# Patient Record
Sex: MALE | Race: WHITE | Employment: UNEMPLOYED | URBAN - METROPOLITAN AREA
[De-identification: names, ages, dates, MRNs, and addresses within clinical notes are randomized per-mention and may not be internally consistent; named-entity substitution may affect disease eponyms.]

---

## 2017-02-14 ENCOUNTER — OFFICE VISIT (OUTPATIENT)
Dept: FAMILY MEDICINE CLINIC | Age: 67
End: 2017-02-14

## 2017-02-14 VITALS
HEART RATE: 89 BPM | WEIGHT: 161 LBS | DIASTOLIC BLOOD PRESSURE: 96 MMHG | RESPIRATION RATE: 16 BRPM | HEIGHT: 63 IN | BODY MASS INDEX: 28.53 KG/M2 | SYSTOLIC BLOOD PRESSURE: 153 MMHG | TEMPERATURE: 97.4 F | OXYGEN SATURATION: 95 %

## 2017-02-14 DIAGNOSIS — E11.00 TYPE 2 DIABETES MELLITUS WITH HYPEROSMOLARITY WITHOUT COMA, WITHOUT LONG-TERM CURRENT USE OF INSULIN (HCC): Primary | ICD-10-CM

## 2017-02-14 LAB — HBA1C MFR BLD HPLC: 6.2 %

## 2017-02-14 NOTE — PROGRESS NOTES
1. Have you been to the ER, urgent care clinic since your last visit? Hospitalized since your last visit? No    2. Have you seen or consulted any other health care providers outside of the [address] [place] [address] since your last visit? Include any pap smears or colon screening.  No

## 2017-02-14 NOTE — MR AVS SNAPSHOT
Visit Information Date & Time Provider Department Dept. Phone Encounter #  
 2/14/2017  9:00 AM Colby Vaughn [phone] [id number] Follow-up Instructions Return in about 3 months (around 5/14/2017) for rov, BENDER PLANT [facility] next visit. Follow-up and Disposition History Your Appointments 8/31/2017 11:00 AM  
ESTABLISHED PATIENT with Adrienne Cleaning MD  
[facility] Appt Note: R/S due to Dr Piedad Dougherty running late; annual f/u rv renal lasix scan  
 [address], [address]  
  
   
 [address], [address] [facility] [address] Upcoming Health Maintenance Date Due Pneumococcal 65+ Low/Medium Risk (2 of 2 - PPSV23) 10/10/2016 HEMOGLOBIN A1C Q6M 5/14/2017 MICROALBUMIN Q1 5/16/2017 MEDICARE YEARLY EXAM 8/17/2017 LIPID PANEL Q1 12/5/2017 GLAUCOMA SCREENING Q2Y 8/16/2018 DTaP/Tdap/Td series (2 - Td) 11/10/2021 COLONOSCOPY 8/16/2026 Allergies as of 2/14/2017  Review Complete On: 2/14/2017 By: Kell Grewal., DO Severity Noted Reaction Type Reactions Januvia [Sitagliptin]  04/13/2016    Diarrhea Morphine  04/13/2016    Other (comments) \"falls asleep\" Penicillins  04/13/2016    Unknown (comments) Current Immunizations  Reviewed on 5/1/2013 Name Date Influenza High Dose Vaccine PF 11/14/2016, 11/30/2015 Influenza Vaccine 10/6/2014, 11/1/2013 10:53 AM  
 Influenza Vaccine Split 10/3/2012 Influenza Vaccine Whole 9/10/2011 Pneumococcal Conjugate (PCV-13) 3/16/2015 Pneumococcal Vaccine (Unspecified Type) 10/10/2011 TDAP Vaccine 11/10/2011 Zoster Vaccine, Live 5/1/2013 Not reviewed this visit You Were Diagnosed With   
  
 Codes Comments  Type 2 diabetes mellitus with hyperosmolarity without coma, without long-term current use of insulin (HCC)    -  Primary ICD-10-CM: E11.00 
 ICD-9-CM: 250.20 Vitals BP Pulse Temp Resp Height(growth percentile) Weight(growth percentile) (!) 153/96 (BP 1 Location: Right arm, BP Patient Position: Sitting) 89 97.4 °F (36.3 °C) (Oral) 16 5' 3\" (1.6 m) 161 lb (73 kg) SpO2 BMI Smoking Status 95% 28.52 kg/m2 Former Smoker BMI and BSA Data Body Mass Index Body Surface Area 28.52 kg/m 2 1.8 m 2 Preferred Pharmacy Pharmacy Name Phone [id number] [address], [address] [facility] [phone] Your Updated Medication List  
  
   
This list is accurate as of: 2/14/17 10:11 AM.  Always use your most recent med list.  
  
  
  
  
 ASPIRIN PO Take 325 mg by mouth. Blood-Glucose Meter monitoring kit Any reliable brand, dx 250.00 , use twice a day  
  
 glucose blood VI test strips strip Commonly known as:  blood glucose test  
Use twice a day, any reliable brand to use with patients glucometer  Dx 250.00  
  
 insulin glargine 100 unit/mL (3 mL) pen Commonly known as:  LANTUS SOLOSTAR  
30 units daily  Indications: type 2 diabetes mellitus Insulin Needles (Disposable) 29 gauge x 1/2\" Ndle Commonly known as:  BD INSULIN PEN NEEDLE UF ORIG  
USE DAILY WITH LANTUS Lancets Misc Twice a day  
  
 tamsulosin 0.4 mg capsule Commonly known as:  FLOMAX TAKE ONE CAPSULE BY MOUTH EVERY DAY AFTER DINNER We Performed the Following AMB POC HEMOGLOBIN A1C [73913 CPT(R)] Comments:  
 Verbal order with read back per Dr. Hira Hanks request  
  
Follow-up Instructions Return in about 3 months (around 5/14/2017) for rov, BENDER PLANT [facility] next visit. Introducing [facility]! Dear Lenny Gain: Thank you for requesting a eshtery account. Our records indicate that you already have an active eshtery account. You can access your account anytime at [email or web address] Did you know that you can access your hospital and ER discharge instructions at any time in Aktino? You can also review all of your test results from your hospital stay or ER visit. Additional Information If you have questions, please visit the Frequently Asked Questions section of the Aktino website at [email or web address] Remember, Aktino is NOT to be used for urgent needs. For medical emergencies, dial 911. Now available from your iPhone and Android! Please provide this summary of care documentation to your next provider. Your primary care clinician is listed as [id number] [facility]. If you have any questions after today's visit, please call [phone].

## 2017-02-14 NOTE — PROGRESS NOTES
Jennifer Darden is a 78 y.o.  male and presents with    Chief Complaint   Patient presents with    Diabetes           Subjective:    Diabetes Mellitus:  He has diabetes mellitus, and  diabetes. Diabetic ROS - diabetic diet compliance: compliant all of the time. Lab review: labs are reviewed, up to date and normal.     Additional Concerns:          Patient Active Problem List    Diagnosis Date Noted    ACP (advance care planning) 08/16/2016    Benign prostatic hyperplasia with lower urinary tract symptoms 02/04/2016    Type II diabetes mellitus ([address] 04/22/2015    Cerebral infarction ([facility] 12/07/2012    Pure hypercholesterolemia 11/10/2011     Current Outpatient Prescriptions   Medication Sig Dispense Refill    tamsulosin (FLOMAX) 0.4 mg capsule TAKE ONE CAPSULE BY MOUTH EVERY DAY AFTER DINNER 90 Cap 2    Insulin Needles, Disposable, (BD INSULIN PEN NEEDLE UF ORIG) 29 gauge x 1/2\" ndle USE DAILY WITH LANTUS 100 Pen Needle 12    Blood-Glucose Meter monitoring kit Any reliable brand, dx 250.00 , use twice a day 1 Kit 0    glucose blood VI test strips (BLOOD GLUCOSE TEST) strip Use twice a day, any reliable brand to use with patients glucometer  Dx 250.00 200 Strip 11    insulin glargine (LANTUS SOLOSTAR) 100 unit/mL (3 mL) pen 30 units daily  Indications: type 2 diabetes mellitus 15 Each 12    Lancets misc Twice a day 200 Each 11    ASPIRIN PO Take 325 mg by mouth.        Allergies   Allergen Reactions    Januvia [Sitagliptin] Diarrhea    Morphine Other (comments)     \"falls asleep\"    Penicillins Unknown (comments)     Past Medical History   Diagnosis Date    Benign prostatic hyperplasia with lower urinary tract symptoms     Cerebral infarction due to thrombosis of precerebral artery (HCC)     Diabetes mellitus ([address] 11/10/2011    Dizzy spells     Hemangioma     Hepatocellular carcinoma ([facility] [address] 11/10/2011    History of MRI     Hydronephrosis 11/10/2011    Liver mass 11/10/2011    Multiple sclerosis ([facility] 11/10/2011     testing was negative for multiple sclerosis    Neuropathy 3/1/2012    Pure hypercholesterolemia 11/10/2011    Renal calculi 11/10/2011    UPJ (ureteropelvic junction) obstruction     Ureteral stent retained     Weak urinary stream      Past Surgical History   Procedure Laterality Date    Hx orchiectomy Left 1970    Hx urological  4/20/16     Cystoscopy, Left retrograde pyelogramLeft robotic-assisted laparoscopic dismembered pyeloplasty with antegrade ureteral stent placementFlexible nephroscopy with stone removal     Family History   Problem Relation Age of Onset    Diabetes Sister      Social History   Substance Use Topics    Smoking status: Former Smoker     Types: Pipe    Smokeless tobacco: Never Used    Alcohol use No      Comment: quit smoking 1994       ROS       All other systems reviewed and are negative. Objective:  Vitals:    02/14/17 0942   BP: (!) 153/96   Pulse: 89   Resp: 16   Temp: 97.4 °F (36.3 °C)   TempSrc: Oral   SpO2: 95%   Weight: 161 lb (73 kg)   Height: 5' 3\" (1.6 m)   PainSc:   0 - No pain                 alert, well appearing, and in no distress, oriented to person, place, and time and normal appearing weight  Chest - clear to auscultation, no wheezes, rales or rhonchi, symmetric air entry  Heart - normal rate, regular rhythm, normal S1, S2, no murmurs, rubs, clicks or gallops  Abdomen - soft, nontender, nondistended, no masses or organomegaly        LABS   aic  6.4  TESTS      Assessment/Plan:    Diabetes - well controlled    Lab review: labs are reviewed, up to date and normal      I have discussed the diagnosis with the patient and the intended plan as seen in the above orders. The patient has received an after-visit summary and questions were answered concerning future plans. I have discussed medication side effects and warnings with the patient as well.  I have reviewed the plan of care with the patient, accepted their input and they are in agreement with the treatment goals. Follow-up Disposition:  Return in about 3 months (around 5/14/2017) for rov, BENDER PLANT [facility] next visit.

## 2017-04-26 DIAGNOSIS — I63.00 CEREBRAL INFARCTION DUE TO THROMBOSIS OF PRECEREBRAL ARTERY (HCC): ICD-10-CM

## 2017-04-26 DIAGNOSIS — E78.00 PURE HYPERCHOLESTEROLEMIA: ICD-10-CM

## 2017-04-26 RX ORDER — INSULIN GLARGINE 100 [IU]/ML
INJECTION, SOLUTION SUBCUTANEOUS
Qty: 15 EACH | Refills: 12 | Status: SHIPPED | OUTPATIENT
Start: 2017-04-26 | End: 2019-04-03

## 2017-05-04 ENCOUNTER — TELEPHONE (OUTPATIENT)
Dept: FAMILY MEDICINE CLINIC | Age: 67
End: 2017-05-04

## 2017-05-04 ENCOUNTER — HOSPITAL ENCOUNTER (OUTPATIENT)
Dept: LAB | Age: 67
Discharge: HOME | End: 2017-05-04

## 2017-05-04 ENCOUNTER — OFFICE VISIT (OUTPATIENT)
Dept: FAMILY MEDICINE CLINIC | Age: 67
End: 2017-05-04

## 2017-05-04 VITALS
TEMPERATURE: 97.5 F | HEIGHT: 63 IN | BODY MASS INDEX: 28.67 KG/M2 | WEIGHT: 161.8 LBS | RESPIRATION RATE: 16 BRPM | DIASTOLIC BLOOD PRESSURE: 80 MMHG | SYSTOLIC BLOOD PRESSURE: 132 MMHG

## 2017-05-04 DIAGNOSIS — M79.605 PAIN IN BOTH LOWER EXTREMITIES: Primary | ICD-10-CM

## 2017-05-04 DIAGNOSIS — M79.604 PAIN IN BOTH LOWER EXTREMITIES: Primary | ICD-10-CM

## 2017-05-04 DIAGNOSIS — E11.00 TYPE 2 DIABETES MELLITUS WITH HYPEROSMOLARITY WITHOUT COMA, WITHOUT LONG-TERM CURRENT USE OF INSULIN (HCC): ICD-10-CM

## 2017-05-04 DIAGNOSIS — E78.00 PURE HYPERCHOLESTEROLEMIA: ICD-10-CM

## 2017-05-04 PROCEDURE — 99001 SPECIMEN HANDLING PT-LAB: CPT | Performed by: FAMILY MEDICINE

## 2017-05-04 RX ORDER — GABAPENTIN 100 MG/1
100 CAPSULE ORAL 2 TIMES DAILY
Qty: 60 CAP | Refills: 2 | Status: SHIPPED | OUTPATIENT
Start: 2017-05-04 | End: 2017-06-01 | Stop reason: SDUPTHER

## 2017-05-04 NOTE — PROGRESS NOTES
Chyna Ruby. is a 78 y.o. male presents today for bilateral leg cramps. Patient reports his BS this morning was 100. Pt is in Room # 5        1. Have you been to the ER, urgent care clinic since your last visit? Hospitalized since your last visit? No    2. Have you seen or consulted any other health care providers outside of the [address] [place] [address] since your last visit? Include any pap smears or colon screening.  No

## 2017-05-04 NOTE — PROGRESS NOTES
Meghann Ty. is a 78 y.o.  male and presents with    Chief Complaint   Patient presents with    Leg Pain    Diabetes    Cholesterol Problem           Subjective:  1. Having problems with leg pain. Worried that lantus is removing K from his system    Cardiovascular Review:  The patient has diabetes, hypertension and hyperlipidemia. Diet and Lifestyle: not attempting to follow a low fat, low cholesterol diet, not attempting to follow a low sodium diet  Home BP Monitoring: is not measured at home. Pertinent ROS: taking medications as instructed, no medication side effects noted, no TIA's, no chest pain on exertion, no dyspnea on exertion, no swelling of ankles. Diabetes Mellitus:  He has diabetes mellitus, and  diabetes, hypertension and hyperlipidemia. Diabetic ROS - diabetic diet compliance: compliant most of the time. Lab review: labs are reviewed, up to date and normal.     Additional Concerns:          Patient Active Problem List    Diagnosis Date Noted    ACP (advance care planning) 08/16/2016    Benign prostatic hyperplasia with lower urinary tract symptoms 02/04/2016    Type II diabetes mellitus ([facility] 04/22/2015    Cerebral infarction ([facility] 12/07/2012    Pure hypercholesterolemia 11/10/2011     Current Outpatient Prescriptions   Medication Sig Dispense Refill    gabapentin (NEURONTIN) 100 mg capsule Take 1 Cap by mouth two (2) times a day.  Indications: NEUROPATHIC PAIN 60 Cap 2    insulin glargine (LANTUS SOLOSTAR) 100 unit/mL (3 mL) pen 32 units daily  Indications: type 2 diabetes mellitus 15 Each 12    tamsulosin (FLOMAX) 0.4 mg capsule TAKE ONE CAPSULE BY MOUTH EVERY DAY AFTER DINNER 90 Cap 2    Insulin Needles, Disposable, (BD INSULIN PEN NEEDLE UF ORIG) 29 gauge x 1/2\" ndle USE DAILY WITH LANTUS 100 Pen Needle 12    Blood-Glucose Meter monitoring kit Any reliable brand, dx 250.00 , use twice a day 1 Kit 0    glucose blood VI test strips (BLOOD GLUCOSE TEST) strip Use twice a day, any reliable brand to use with patients glucometer  Dx 250.00 200 Strip 11    Lancets misc Twice a day 200 Each 11    ASPIRIN PO Take 325 mg by mouth. Allergies   Allergen Reactions    Januvia [Sitagliptin] Diarrhea    Morphine Other (comments)     \"falls asleep\"    Penicillins Unknown (comments)     Past Medical History:   Diagnosis Date    Benign prostatic hyperplasia with lower urinary tract symptoms     Cerebral infarction due to thrombosis of precerebral artery (HCC)     Diabetes mellitus ([facility] 11/10/2011    Dizzy spells     Hemangioma     Hepatocellular carcinoma ([facility] 11/10/2011    History of MRI     Hydronephrosis 11/10/2011    Liver mass 11/10/2011    Multiple sclerosis ([facility] 11/10/2011    testing was negative for multiple sclerosis    Neuropathy 3/1/2012    Pure hypercholesterolemia 11/10/2011    Renal calculi 11/10/2011    UPJ (ureteropelvic junction) obstruction     Ureteral stent retained     Weak urinary stream      Past Surgical History:   Procedure Laterality Date    HX ORCHIECTOMY Left 1970    HX UROLOGICAL  4/20/16    Cystoscopy, Left retrograde pyelogramLeft robotic-assisted laparoscopic dismembered pyeloplasty with antegrade ureteral stent placementFlexible nephroscopy with stone removal     Family History   Problem Relation Age of Onset    Diabetes Sister      Social History   Substance Use Topics    Smoking status: Former Smoker     Types: Pipe    Smokeless tobacco: Never Used    Alcohol use No      Comment: quit smoking 1994       ROS       All other systems reviewed and are negative.       Objective:  Vitals:    05/04/17 1241   BP: 132/80   Resp: 16   Temp: 97.5 °F (36.4 °C)   TempSrc: Oral   Weight: 161 lb 12.8 oz (73.4 kg)   Height: 5' 3\" (1.6 m)   PainSc:   2   PainLoc: Leg                 alert, well appearing, and in no distress, oriented to person, place, and time and normal appearing weight  Chest - clear to auscultation, no wheezes, rales or rhonchi, symmetric air entry  Heart - normal rate, regular rhythm, normal S1, S2, no murmurs, rubs, clicks or gallops  Abdomen - soft, nontender, nondistended, no masses or organomegaly        LABS     TESTS      Assessment/Plan:    Diabetes - stable  Hypertension - stable  Hyperlipidemia - stable  Leg cramps ? ? Check labs, try neurontin f/u 1 mo      Lab review: labs are reviewed, up to date and normal      I have discussed the diagnosis with the patient and the intended plan as seen in the above orders. The patient has received an after-visit summary and questions were answered concerning future plans. I have discussed medication side effects and warnings with the patient as well. I have reviewed the plan of care with the patient, accepted their input and they are in agreement with the treatment goals.      Follow-up Disposition: Not on File

## 2017-05-04 NOTE — MR AVS SNAPSHOT
Visit Information Date & Time Provider Department Dept. Phone Encounter #  
 5/4/2017 12:30 PM Colby Vaughn [phone] [id number] Follow-up Instructions Return in about 4 weeks (around 6/1/2017) for EOV, HGAIC next visit. Follow-up and Disposition History Your Appointments 5/15/2017  9:30 AM  
ROUT[place] [address] [address] Appt Note: Return in about 3 months (around 5/14/2017) for YULIA rodrigues PLANT [facility] next visit. [address] [facility] [address]  
  
   
 [address] [facility] [address]  
  
    
 8/31/2017 11:00 AM  
ESTABLISHED PATIENT with Shruthi Petersen MD  
Urology of [facility] ([facility]) Appt Note: R/S due to Dr Meghana Frausto running late; annual f/u rv renal lasix scan  
 [address], [place] [address] [facility] [address]  
  
   
 [address], [address] [facility] [address] Upcoming Health Maintenance Date Due Pneumococcal 65+ Low/Medium Risk (2 of 2 - PPSV23) 10/10/2016 MICROALBUMIN Q1 5/16/2017 INFLUENZA AGE 9 TO ADULT 8/1/2017 HEMOGLOBIN A1C Q6M 8/14/2017 MEDICARE YEARLY EXAM 8/17/2017 LIPID PANEL Q1 12/5/2017 GLAUCOMA SCREENING Q2Y 8/16/2018 DTaP/Tdap/Td series (2 - Td) 11/10/2021 COLONOSCOPY 8/16/2026 Allergies as of 5/4/2017  Review Complete On: 5/4/2017 By: Ronnie Dukes DO Severity Noted Reaction Type Reactions Januvia [Sitagliptin]  04/13/2016    Diarrhea Morphine  04/13/2016    Other (comments) \"falls asleep\" Penicillins  04/13/2016    Unknown (comments) Current Immunizations  Reviewed on 5/1/2013 Name Date Influenza High Dose Vaccine PF 11/14/2016, 11/30/2015 Influenza Vaccine 10/6/2014, 11/1/2013 10:53 AM  
 Influenza Vaccine Split 10/3/2012 Influenza Vaccine Whole 9/10/2011 Pneumococcal Conjugate (PCV-13) 3/16/2015 Pneumococcal Vaccine (Unspecified Type) 10/10/2011 TDAP Vaccine 11/10/2011 Zoster Vaccine, Live 5/1/2013 Not reviewed this visit You Were Diagnosed With   
  
 Codes Comments Pain in both lower extremities    -  Primary ICD-10-CM: M79.604, M79.605 ICD-9-CM: 729.5 Type 2 diabetes mellitus with hyperosmolarity without coma, without long-term current use of insulin (HCC)     ICD-10-CM: E11.00 ICD-9-CM: 250.20 Pure hypercholesterolemia     ICD-10-CM: E78.00 ICD-9-CM: 272.0 Vitals BP Temp Resp Height(growth percentile) Weight(growth percentile) BMI  
 132/80 (BP 1 Location: Left arm, BP Patient Position: Sitting) 97.5 °F (36.4 °C) (Oral) 16 5' 3\" (1.6 m) 161 lb 12.8 oz (73.4 kg) 28.66 kg/m2 Smoking Status Former Smoker BMI and BSA Data Body Mass Index Body Surface Area  
 28.66 kg/m 2 1.81 m 2 Preferred Pharmacy Pharmacy Name Phone [id number] [address] [facility], [address] [place] [address] [phone] Your Updated Medication List  
  
   
This list is accurate as of: 5/4/17 12:57 PM.  Always use your most recent med list.  
  
  
  
  
 ASPIRIN PO Take 325 mg by mouth. Blood-Glucose Meter monitoring kit Any reliable brand, dx 250.00 , use twice a day  
  
 gabapentin 100 mg capsule Commonly known as:  NEURONTIN Take 1 Cap by mouth two (2) times a day. Indications: NEUROPATHIC PAIN  
  
 glucose blood VI test strips strip Commonly known as:  blood glucose test  
Use twice a day, any reliable brand to use with patients glucometer  Dx 250.00  
  
 insulin glargine 100 unit/mL (3 mL) pen Commonly known as:  LANTUS SOLOSTAR  
32 units daily  Indications: type 2 diabetes mellitus Insulin Needles (Disposable) 29 gauge x 1/2\" Ndle Commonly known as:  BD INSULIN PEN NEEDLE UF ORIG  
USE DAILY WITH LANTUS Lancets Misc Twice a day tamsulosin 0.4 mg capsule Commonly known as:  FLOMAX TAKE ONE CAPSULE BY MOUTH EVERY DAY AFTER DINNER Prescriptions Sent to Pharmacy Refills  
 gabapentin (NEURONTIN) 100 mg capsule 2 Sig: Take 1 Cap by mouth two (2) times a day. Indications: NEUROPATHIC PAIN Class: Normal  
 Pharmacy: [address] [place] [address], [address] [place] [address], [address]  #: [phone] Route: Oral  
  
Follow-up Instructions Return in about 4 weeks (around 6/1/2017) for EOV, HGAIC next visit. To-Do List   
 05/04/2017 Lab:  CBC WITH AUTOMATED DIFF   
  
 05/04/2017 Lab:  FERRITIN   
  
 05/04/2017 Lab:  HEMOGLOBIN A1C WITH EAG   
  
 05/04/2017 Lab:  MAGNESIUM   
  
 05/04/2017 Lab:  METABOLIC PANEL, COMPREHENSIVE   
  
 05/04/2017 Lab:  MICROALBUMIN, UR, RAND W/ MICROALBUMIN/CREA RATIO   
  
 05/04/2017 Lab:  URINALYSIS W/ RFLX MICROSCOPIC   
  
 05/04/2017 Lab:  VITAMIN B12 & FOLATE Introducing [facility]! Dear Liz Nunez: Thank you for requesting a cafegive account. Our records indicate that you already have an active cafegive account. You can access your account anytime at [email or web address] Did you know that you can access your hospital and ER discharge instructions at any time in cafegive? You can also review all of your test results from your hospital stay or ER visit. Additional Information If you have questions, please visit the Frequently Asked Questions section of the cafegive website at [email or web address] Remember, cafegive is NOT to be used for urgent needs. For medical emergencies, dial 911. Now available from your iPhone and Android! Please provide this summary of care documentation to your next provider. Your primary care clinician is listed as [address]. If you have any questions after today's visit, please call [phone].

## 2017-05-04 NOTE — TELEPHONE ENCOUNTER
Patient called complaining of leg cramps from taking his insulin. Per Dr. Antonio Man patient can be seen today at 200.

## 2017-05-05 LAB
ALBUMIN SERPL-MCNC: 4.3 G/DL (ref 3.6–4.8)
ALBUMIN/CREAT UR: 21.7 MG/G CREAT (ref 0–30)
ALBUMIN/GLOB SERPL: 1.6 {RATIO} (ref 1.2–2.2)
ALP SERPL-CCNC: 61 IU/L (ref 39–117)
ALT SERPL-CCNC: 19 IU/L (ref 0–44)
APPEARANCE UR: CLEAR
AST SERPL-CCNC: 21 IU/L (ref 0–40)
BASOPHILS # BLD AUTO: 0 X10E3/UL (ref 0–0.2)
BASOPHILS NFR BLD AUTO: 1 %
BILIRUB SERPL-MCNC: 0.9 MG/DL (ref 0–1.2)
BILIRUB UR QL STRIP: NEGATIVE
BUN SERPL-MCNC: 16 MG/DL (ref 8–27)
BUN/CREAT SERPL: 18 (ref 10–24)
CALCIUM SERPL-MCNC: 9.2 MG/DL (ref 8.6–10.2)
CHLORIDE SERPL-SCNC: 100 MMOL/L (ref 96–106)
CO2 SERPL-SCNC: 25 MMOL/L (ref 18–29)
COLOR UR: YELLOW
CREAT SERPL-MCNC: 0.87 MG/DL (ref 0.76–1.27)
CREAT UR-MCNC: 71.3 MG/DL
EOSINOPHIL # BLD AUTO: 0.2 X10E3/UL (ref 0–0.4)
EOSINOPHIL NFR BLD AUTO: 5 %
ERYTHROCYTE [DISTWIDTH] IN BLOOD BY AUTOMATED COUNT: 14.2 % (ref 12.3–15.4)
EST. AVERAGE GLUCOSE BLD GHB EST-MCNC: 128 MG/DL
FERRITIN SERPL-MCNC: 19 NG/ML (ref 30–400)
FOLATE SERPL-MCNC: >20 NG/ML
GLOBULIN SER CALC-MCNC: 2.7 G/DL (ref 1.5–4.5)
GLUCOSE SERPL-MCNC: 73 MG/DL (ref 65–99)
GLUCOSE UR QL: NEGATIVE
HBA1C MFR BLD: 6.1 % (ref 4.8–5.6)
HCT VFR BLD AUTO: 45.6 % (ref 37.5–51)
HGB BLD-MCNC: 15.4 G/DL (ref 12.6–17.7)
HGB UR QL STRIP: NEGATIVE
IMM GRANULOCYTES # BLD: 0 X10E3/UL (ref 0–0.1)
IMM GRANULOCYTES NFR BLD: 0 %
KETONES UR QL STRIP: NEGATIVE
LEUKOCYTE ESTERASE UR QL STRIP: NEGATIVE
LYMPHOCYTES # BLD AUTO: 1.2 X10E3/UL (ref 0.7–3.1)
LYMPHOCYTES NFR BLD AUTO: 23 %
MAGNESIUM SERPL-MCNC: 2.3 MG/DL (ref 1.6–2.3)
MCH RBC QN AUTO: 29.3 PG (ref 26.6–33)
MCHC RBC AUTO-ENTMCNC: 33.8 G/DL (ref 31.5–35.7)
MCV RBC AUTO: 87 FL (ref 79–97)
MICRO URNS: NORMAL
MICROALBUMIN UR-MCNC: 15.5 UG/ML
MONOCYTES # BLD AUTO: 0.6 X10E3/UL (ref 0.1–0.9)
MONOCYTES NFR BLD AUTO: 10 %
NEUTROPHILS # BLD AUTO: 3.3 X10E3/UL (ref 1.4–7)
NEUTROPHILS NFR BLD AUTO: 61 %
NITRITE UR QL STRIP: NEGATIVE
PH UR STRIP: 7 [PH] (ref 5–7.5)
PLATELET # BLD AUTO: 157 X10E3/UL (ref 150–379)
POTASSIUM SERPL-SCNC: 4.1 MMOL/L (ref 3.5–5.2)
PROT SERPL-MCNC: 7 G/DL (ref 6–8.5)
PROT UR QL STRIP: NEGATIVE
RBC # BLD AUTO: 5.26 X10E6/UL (ref 4.14–5.8)
SODIUM SERPL-SCNC: 141 MMOL/L (ref 134–144)
SP GR UR: 1.01 (ref 1–1.03)
UROBILINOGEN UR STRIP-MCNC: 1 MG/DL (ref 0.2–1)
VIT B12 SERPL-MCNC: 698 PG/ML (ref 211–946)
WBC # BLD AUTO: 5.3 X10E3/UL (ref 3.4–10.8)

## 2017-06-01 ENCOUNTER — OFFICE VISIT (OUTPATIENT)
Dept: FAMILY MEDICINE CLINIC | Age: 67
End: 2017-06-01

## 2017-06-01 VITALS
TEMPERATURE: 96.9 F | DIASTOLIC BLOOD PRESSURE: 72 MMHG | HEIGHT: 63 IN | RESPIRATION RATE: 14 BRPM | SYSTOLIC BLOOD PRESSURE: 130 MMHG | HEART RATE: 79 BPM | OXYGEN SATURATION: 97 % | BODY MASS INDEX: 29.34 KG/M2 | WEIGHT: 165.6 LBS

## 2017-06-01 DIAGNOSIS — E08.00 DIABETES MELLITUS DUE TO UNDERLYING CONDITION WITH HYPEROSMOLARITY WITHOUT COMA, WITHOUT LONG-TERM CURRENT USE OF INSULIN (HCC): Primary | ICD-10-CM

## 2017-06-01 DIAGNOSIS — M79.605 PAIN IN BOTH LOWER EXTREMITIES: ICD-10-CM

## 2017-06-01 DIAGNOSIS — M79.604 PAIN IN BOTH LOWER EXTREMITIES: ICD-10-CM

## 2017-06-01 DIAGNOSIS — E78.00 PURE HYPERCHOLESTEROLEMIA: ICD-10-CM

## 2017-06-01 DIAGNOSIS — E11.00 TYPE 2 DIABETES MELLITUS WITH HYPEROSMOLARITY WITHOUT COMA, WITHOUT LONG-TERM CURRENT USE OF INSULIN (HCC): ICD-10-CM

## 2017-06-01 DIAGNOSIS — N42.9 DISORDER OF PROSTATE: ICD-10-CM

## 2017-06-01 LAB — HBA1C MFR BLD HPLC: 6.1 %

## 2017-06-01 RX ORDER — GABAPENTIN 100 MG/1
100 CAPSULE ORAL 2 TIMES DAILY
Qty: 60 CAP | Refills: 2 | Status: SHIPPED | OUTPATIENT
Start: 2017-06-01 | End: 2019-04-03

## 2017-06-01 NOTE — MR AVS SNAPSHOT
Visit Information Date & Time Provider Department Dept. Phone Encounter #  
 6/1/2017  3:00 PM Colby Booker [id number] Follow-up Instructions Return in about 3 months (around 9/1/2017) for physical, labs prior, EKG next visit. Your Appointments 6/1/2017  3:00 PM  
ROUTINE CARE with [place]leen Funes [place] [address] [place] [address]) Appt Note: Return in about 4 weeks (around 6/1/2017) for EOV, [address] next visit.; Confirmed 5/31/2017 ce  
 [address] [place]  
  
   
 [address]  
  
    
 8/31/2017 11:00 AM  
ESTABLISHED PATIENT with Teto Colorado MD  
[facility] ([address]) Appt Note: R/S due to Dr Ginny Treviño running late; annual f/u rv renal lasix scan  
 [address], [place]  
  
   
 [address], [address] [facility] [address] Upcoming Health Maintenance Date Due Pneumococcal 65+ Low/Medium Risk (2 of 2 - PPSV23) 10/10/2016 INFLUENZA AGE 9 TO ADULT 8/1/2017 MEDICARE YEARLY EXAM 8/17/2017 HEMOGLOBIN A1C Q6M 11/4/2017 LIPID PANEL Q1 12/5/2017 MICROALBUMIN Q1 5/4/2018 GLAUCOMA SCREENING Q2Y 8/16/2018 DTaP/Tdap/Td series (2 - Td) 11/10/2021 COLONOSCOPY 8/16/2026 Allergies as of 6/1/2017  Review Complete On: 6/1/2017 By: Darleen Funes DO Severity Noted Reaction Type Reactions Januvia [Sitagliptin]  04/13/2016    Diarrhea Morphine  04/13/2016    Other (comments) \"falls asleep\" Penicillins  04/13/2016    Unknown (comments) Current Immunizations  Reviewed on 5/1/2013 Name Date Influenza High Dose Vaccine PF 11/14/2016, 11/30/2015 Influenza Vaccine 10/6/2014, 11/1/2013 10:53 AM  
 Influenza Vaccine Split 10/3/2012 Influenza Vaccine Whole 9/10/2011 Pneumococcal Conjugate (PCV-13) 3/16/2015 Pneumococcal Vaccine (Unspecified Type) 10/10/2011 TDAP Vaccine 11/10/2011 Zoster Vaccine, Live 5/1/2013 Not reviewed this visit You Were Diagnosed With   
  
 Codes Comments Diabetes mellitus due to underlying condition with hyperosmolarity without coma, without long-term current use of insulin (HCC)    -  Primary ICD-10-CM: E08.00 ICD-9-CM: 249.20 Pain in both lower extremities     ICD-10-CM: M79.604, M79.605 ICD-9-CM: 729.5 Type 2 diabetes mellitus with hyperosmolarity without coma, without long-term current use of insulin (HCC)     ICD-10-CM: E11.00 ICD-9-CM: 250.20 Pure hypercholesterolemia     ICD-10-CM: E78.00 ICD-9-CM: 272.0 Disorder of prostate     ICD-10-CM: N42.9 ICD-9-CM: 602.9 Vitals BP Pulse Temp Resp Height(growth percentile) Weight(growth percentile) 130/72 (BP 1 Location: Left arm, BP Patient Position: Sitting) 79 96.9 °F (36.1 °C) (Oral) 14 5' 3\" (1.6 m) 165 lb 9.6 oz (75.1 kg) SpO2 BMI Smoking Status 97% 29.33 kg/m2 Former Smoker BMI and BSA Data Body Mass Index Body Surface Area  
 29.33 kg/m 2 1.83 m 2 Preferred Pharmacy Pharmacy Name Phone [id number]- [address] [phone] Your Updated Medication List  
  
   
This list is accurate as of: 6/1/17  2:54 PM.  Always use your most recent med list.  
  
  
  
  
 ASPIRIN PO Take 325 mg by mouth. Blood-Glucose Meter monitoring kit Any reliable brand, dx 250.00 , use twice a day  
  
 gabapentin 100 mg capsule Commonly known as:  NEURONTIN Take 1 Cap by mouth two (2) times a day. Indications: NEUROPATHIC PAIN  
  
 glucose blood VI test strips strip Commonly known as:  blood glucose test  
Use twice a day, any reliable brand to use with patients glucometer  Dx 250.00  
  
 insulin glargine 100 unit/mL (3 mL) Inpn Commonly known as:  LANTUS,BASAGLAR  
32 units daily  Indications: type 2 diabetes mellitus Insulin Needles (Disposable) 29 gauge x 1/2\" Ndle Commonly known as:  BD INSULIN PEN NEEDLE UF ORIG  
USE DAILY WITH LANTUS Lancets Misc Twice a day  
  
 tamsulosin 0.4 mg capsule Commonly known as:  FLOMAX TAKE ONE CAPSULE BY MOUTH EVERY DAY AFTER DINNER Prescriptions Sent to Pharmacy Refills  
 gabapentin (NEURONTIN) 100 mg capsule 2 Sig: Take 1 Cap by mouth two (2) times a day. Indications: NEUROPATHIC PAIN Class: Normal  
 Pharmacy: [address] [place] [address], [address] [facility] Ph #: [phone] Route: Oral  
  
We Performed the Following AMB POC HEMOGLOBIN A1C [23472 CPT(R)] Follow-up Instructions Return in about 3 months (around 9/1/2017) for physical, labs prior, EKG next visit. To-Do List   
 06/01/2017 Lab:  CBC WITH AUTOMATED DIFF   
  
 06/01/2017 Lab:  HEMOGLOBIN A1C WITH EAG   
  
 06/01/2017 Lab:  LIPID PANEL   
  
 06/01/2017 Lab:  METABOLIC PANEL, COMPREHENSIVE   
  
 06/01/2017 Lab:  MICROALBUMIN, UR, RAND W/ MICROALBUMIN/CREA RATIO   
  
 06/01/2017 Lab:  PROSTATE SPECIFIC AG (PSA)   
  
 06/01/2017 Lab:  TSH 3RD GENERATION   
  
 06/01/2017 Lab:  URINALYSIS W/ RFLX MICROSCOPIC Introducing [facility] & HEALTH SERVICES! Dear Lidia Garsia: Thank you for requesting a [employer] account. Our records indicate that you already have an active [employer] account. You can access your account anytime at [email or web address] Did you know that you can access your hospital and ER discharge instructions at any time in [employer]? You can also review all of your test results from your hospital stay or ER visit. Additional Information If you have questions, please visit the Frequently Asked Questions section of the [employer] website at [email or web address] Remember, "[employer]"hart is NOT to be used for urgent needs. For medical emergencies, dial 911. Now available from your iPhone and Android! Please provide this summary of care documentation to your next provider. Your primary care clinician is listed as [id number] [place] [address]. If you have any questions after today's visit, please call [phone].

## 2017-06-01 NOTE — PROGRESS NOTES
Josh Wolf. is a 78 y.o.  male and presents with    Chief Complaint   Patient presents with    Diabetes           Subjective:    Diabetes Mellitus:  He has diabetes mellitus, and  diabetes. Diabetic ROS - diabetic diet compliance: compliant all of the time. Lab review: labs are reviewed, up to date and normal.     Additional Concerns:          Patient Active Problem List    Diagnosis Date Noted    ACP (advance care planning) 08/16/2016    Benign prostatic hyperplasia with lower urinary tract symptoms 02/04/2016    Type II diabetes mellitus ([facility] 04/22/2015    Cerebral infarction ([facility] 12/07/2012     Current Outpatient Prescriptions   Medication Sig Dispense Refill    gabapentin (NEURONTIN) 100 mg capsule Take 1 Cap by mouth two (2) times a day. Indications: NEUROPATHIC PAIN 60 Cap 2    insulin glargine (LANTUS SOLOSTAR) 100 unit/mL (3 mL) pen 32 units daily  Indications: type 2 diabetes mellitus 15 Each 12    tamsulosin (FLOMAX) 0.4 mg capsule TAKE ONE CAPSULE BY MOUTH EVERY DAY AFTER DINNER 90 Cap 2    Insulin Needles, Disposable, (BD INSULIN PEN NEEDLE UF ORIG) 29 gauge x 1/2\" ndle USE DAILY WITH LANTUS 100 Pen Needle 12    Blood-Glucose Meter monitoring kit Any reliable brand, dx 250.00 , use twice a day 1 Kit 0    glucose blood VI test strips (BLOOD GLUCOSE TEST) strip Use twice a day, any reliable brand to use with patients glucometer  Dx 250.00 200 Strip 11    Lancets misc Twice a day 200 Each 11    ASPIRIN PO Take 325 mg by mouth.        Allergies   Allergen Reactions    Januvia [Sitagliptin] Diarrhea    Morphine Other (comments)     \"falls asleep\"    Penicillins Unknown (comments)     Past Medical History:   Diagnosis Date    Benign prostatic hyperplasia with lower urinary tract symptoms     Cerebral infarction due to thrombosis of precerebral artery ([facility]     Diabetes mellitus ([facility] 11/10/2011    Dizzy spells     Hemangioma     Hepatocellular carcinoma ([facility] 11/10/2011    History of MRI     Hydronephrosis 11/10/2011    Liver mass 11/10/2011    Multiple sclerosis ([facility] 11/10/2011    testing was negative for multiple sclerosis    Neuropathy 3/1/2012    Pure hypercholesterolemia 11/10/2011    Renal calculi 11/10/2011    UPJ (ureteropelvic junction) obstruction     Ureteral stent retained     Weak urinary stream      Past Surgical History:   Procedure Laterality Date    HX ORCHIECTOMY Left 1970    HX UROLOGICAL  4/20/16    Cystoscopy, Left retrograde pyelogramLeft robotic-assisted laparoscopic dismembered pyeloplasty with antegrade ureteral stent placementFlexible nephroscopy with stone removal     Family History   Problem Relation Age of Onset    Diabetes Sister      Social History   Substance Use Topics    Smoking status: Former Smoker     Types: Pipe    Smokeless tobacco: Never Used    Alcohol use No      Comment: quit smoking 1994       ROS       All other systems reviewed and are negative. Objective:  Vitals:    06/01/17 1438   BP: 130/72   Pulse: 79   Resp: 14   Temp: 96.9 °F (36.1 °C)   TempSrc: Oral   SpO2: 97%   Weight: 165 lb 9.6 oz (75.1 kg)   Height: 5' 3\" (1.6 m)   PainSc:   0 - No pain                 alert, well appearing, and in no distress, oriented to person, place, and time and normal appearing weight  Chest - clear to auscultation, no wheezes, rales or rhonchi, symmetric air entry  Heart - normal rate, regular rhythm, normal S1, S2, no murmurs, rubs, clicks or gallops  Abdomen - soft, nontender, nondistended, no masses or organomegaly        LABS   6.1    TESTS      Assessment/Plan:    Diabetes - well controlled    Lab review: labs are reviewed, up to date and normal      I have discussed the diagnosis with the patient and the intended plan as seen in the above orders. The patient has received an after-visit summary and questions were answered concerning future plans.   I have discussed medication side effects and warnings with the patient as well. I have reviewed the plan of care with the patient, accepted their input and they are in agreement with the treatment goals. Follow-up Disposition:  Return in about 3 months (around 9/1/2017) for rov, [employer] [facility] next visit.

## 2017-06-01 NOTE — PROGRESS NOTES
Eleanor Bradley. is a 78 y.o. male presented to clinic for routine A1C. Pt denies any pain or concerns at this time. 1. Have you been to the ER, urgent care clinic since your last visit? Hospitalized since your last visit? No    2. Have you seen or consulted any other health care providers outside of the [address] [place] [address] since your last visit? Include any pap smears or colon screening. No     Learning Assessment 9/24/2015   PRIMARY LEARNER Patient   HIGHEST LEVEL OF EDUCATION - PRIMARY LEARNER  -   BARRIERS PRIMARY LEARNER -   CO-LEARNER CAREGIVER -   PRIMARY LANGUAGE ENGLISH   LEARNER PREFERENCE PRIMARY OTHER (COMMENT)   ANSWERED BY PATIENT   RELATIONSHIP SELF      Patient verbally agrees to permit the Students working in [phone] office to observe and participate in medical care during the appointment today, including, where appropriate, providing direct medical care to patient under the physicians direct supervision. Patient agrees that he/she have been given the opportunity to refuse to give such consent and may withdraw consent at any time during appointment.

## 2017-08-07 ENCOUNTER — HOSPITAL ENCOUNTER (OUTPATIENT)
Dept: GENERAL RADIOLOGY | Age: 67
Discharge: HOME | End: 2017-08-07
Attending: FAMILY MEDICINE
Payer: MEDICARE

## 2017-08-07 ENCOUNTER — OFFICE VISIT (OUTPATIENT)
Dept: FAMILY MEDICINE CLINIC | Age: 67
End: 2017-08-07

## 2017-08-07 ENCOUNTER — TELEPHONE (OUTPATIENT)
Dept: FAMILY MEDICINE CLINIC | Age: 67
End: 2017-08-07

## 2017-08-07 VITALS
HEART RATE: 73 BPM | WEIGHT: 165.6 LBS | DIASTOLIC BLOOD PRESSURE: 90 MMHG | RESPIRATION RATE: 16 BRPM | OXYGEN SATURATION: 96 % | TEMPERATURE: 97.6 F | HEIGHT: 63 IN | BODY MASS INDEX: 29.34 KG/M2 | SYSTOLIC BLOOD PRESSURE: 162 MMHG

## 2017-08-07 DIAGNOSIS — M79.645 THUMB PAIN, LEFT: ICD-10-CM

## 2017-08-07 DIAGNOSIS — M79.645 THUMB PAIN, LEFT: Primary | ICD-10-CM

## 2017-08-07 PROCEDURE — 73130 X-RAY EXAM OF HAND: CPT

## 2017-08-07 NOTE — MR AVS SNAPSHOT
Visit Information Date & Time Provider Department Dept. Phone Encounter #  
 8/7/2017  8:30 AM Doc Mahoney., [address] [facility] [phone] [id number] Follow-up Instructions Return in about 4 weeks (around 9/4/2017) for already scheduled. Follow-up and Disposition History Your Appointments 8/31/2017 11:00 AM  
ESTABLISHED PATIENT with Sarthak Serra MD  
Urology of [address]) Appt Note: R/S due to Dr Bennett Michael running late; annual f/u rv renal lasix scan  
 [address] [facility], [place] [address]  
  
   
 [address] [facility], [facility] [address] [address]  
  
    
 9/5/2017  2:00 PM  
ROUTINE CARE with [place]ccracken [place] [address] [place] [address]) Appt Note: Return in about 3 months (around 9/1/2017) for physical, labs prior, EKG next visit. [address] [facility] [address] [facility] [address]  
  
   
 [address] [facility] [address] [place] [address] [address] Upcoming Health Maintenance Date Due Pneumococcal 65+ Low/Medium Risk (2 of 2 - PPSV23) 10/10/2016 INFLUENZA AGE 9 TO ADULT 8/1/2017 MEDICARE YEARLY EXAM 8/17/2017 HEMOGLOBIN A1C Q6M 12/1/2017 LIPID PANEL Q1 12/5/2017 MICROALBUMIN Q1 5/4/2018 GLAUCOMA SCREENING Q2Y 8/16/2018 DTaP/Tdap/Td series (2 - Td) 11/10/2021 COLONOSCOPY 8/16/2026 Allergies as of 8/7/2017  Review Complete On: 8/7/2017 By: Doc Mccracken DO Severity Noted Reaction Type Reactions Januvia [Sitagliptin]  04/13/2016    Diarrhea Morphine  04/13/2016    Other (comments) \"falls asleep\" Penicillins  04/13/2016    Unknown (comments) Current Immunizations  Reviewed on 5/1/2013 Name Date Influenza High Dose Vaccine PF 11/14/2016, 11/30/2015 Influenza Vaccine 10/6/2014, 11/1/2013 10:53 AM  
 Influenza Vaccine Split 10/3/2012 Influenza Vaccine Whole 9/10/2011 Pneumococcal Conjugate (PCV-13) 3/16/2015 TDAP Vaccine 11/10/2011 ZZZ-RETIRED (DO NOT USE) Pneumococcal Vaccine (Unspecified Type) 10/10/2011 Zoster Vaccine, Live 5/1/2013 Not reviewed this visit You Were Diagnosed With   
  
 Codes Comments Thumb pain, left    -  Primary ICD-10-CM: G16.678 ICD-9-CM: 729.5 Vitals BP Pulse Temp Resp Height(growth percentile) Weight(growth percentile) 162/90 (BP 1 Location: Left arm, BP Patient Position: Sitting) 73 97.6 °F (36.4 °C) (Oral) 16 5' 3\" (1.6 m) 165 lb 9.6 oz (75.1 kg) SpO2 BMI Smoking Status 96% 29.33 kg/m2 Former Smoker BMI and BSA Data Body Mass Index Body Surface Area  
 29.33 kg/m 2 1.83 m 2 Preferred Pharmacy Pharmacy Name Phone [id number] [place], [address] [place] [address] [phone] Your Updated Medication List  
  
   
This list is accurate as of: 8/7/17  8:54 AM.  Always use your most recent med list.  
  
  
  
  
 ASPIRIN PO Take 325 mg by mouth. Blood-Glucose Meter monitoring kit Any reliable brand, dx 250.00 , use twice a day  
  
 gabapentin 100 mg capsule Commonly known as:  NEURONTIN Take 1 Cap by mouth two (2) times a day. Indications: NEUROPATHIC PAIN  
  
 glucose blood VI test strips strip Commonly known as:  blood glucose test  
Use twice a day, any reliable brand to use with patients glucometer  Dx 250.00  
  
 insulin glargine 100 unit/mL (3 mL) Inpn Commonly known as:  LANTUS,BASAGLAR  
32 units daily  Indications: type 2 diabetes mellitus Insulin Needles (Disposable) 29 gauge x 1/2\" Ndle Commonly known as:  BD INSULIN PEN NEEDLE UF ORIG  
USE DAILY WITH LANTUS Lancets Misc Twice a day  
  
 tamsulosin 0.4 mg capsule Commonly known as:  FLOMAX TAKE ONE CAPSULE BY MOUTH EVERY DAY AFTER DINNER Follow-up Instructions Return in about 4 weeks (around 9/4/2017) for already scheduled. To-Do List   
 08/07/2017 Imaging:  XR HAND LT MIN 3 V Introducing [facility] & HEALTH SERVICES! Dear Vicente Galan: Thank you for requesting a [employer] account. Our records indicate that you already have an active [employer] account. You can access your account anytime at [email or web address] Did you know that you can access your hospital and ER discharge instructions at any time in [employer]? You can also review all of your test results from your hospital stay or ER visit. Additional Information If you have questions, please visit the Frequently Asked Questions section of the [employer] website at [email or web address] Remember, [employer] is NOT to be used for urgent needs. For medical emergencies, dial 911. Now available from your iPhone and Android! Please provide this summary of care documentation to your next provider. Your primary care clinician is listed as [address]. If you have any questions after today's visit, please call [phone].

## 2017-08-07 NOTE — TELEPHONE ENCOUNTER
Notify pt that x rays showed arthritis in hand. No sign of fracture.    No other tx recommended at this time  Dr Levy Lund

## 2017-08-07 NOTE — PROGRESS NOTES
Jarred San is a 78 y.o.  male and presents with    Chief Complaint   Patient presents with    Hand Pain     Was working on his car engine 1 mo ago and fell on outstretched left hand and has ongoing pain in left thumb        Subjective: Additional Concerns:          Patient Active Problem List    Diagnosis Date Noted    ACP (advance care planning) 08/16/2016    Benign prostatic hyperplasia with lower urinary tract symptoms 02/04/2016    Type II diabetes mellitus ([facility] 04/22/2015    Cerebral infarction ([facility] 12/07/2012     Current Outpatient Prescriptions   Medication Sig Dispense Refill    gabapentin (NEURONTIN) 100 mg capsule Take 1 Cap by mouth two (2) times a day. Indications: NEUROPATHIC PAIN 60 Cap 2    insulin glargine (LANTUS SOLOSTAR) 100 unit/mL (3 mL) pen 32 units daily  Indications: type 2 diabetes mellitus 15 Each 12    tamsulosin (FLOMAX) 0.4 mg capsule TAKE ONE CAPSULE BY MOUTH EVERY DAY AFTER DINNER 90 Cap 2    Insulin Needles, Disposable, (BD INSULIN PEN NEEDLE UF ORIG) 29 gauge x 1/2\" ndle USE DAILY WITH LANTUS 100 Pen Needle 12    Blood-Glucose Meter monitoring kit Any reliable brand, dx 250.00 , use twice a day 1 Kit 0    glucose blood VI test strips (BLOOD GLUCOSE TEST) strip Use twice a day, any reliable brand to use with patients glucometer  Dx 250.00 200 Strip 11    Lancets misc Twice a day 200 Each 11    ASPIRIN PO Take 325 mg by mouth.        Allergies   Allergen Reactions    Januvia [Sitagliptin] Diarrhea    Morphine Other (comments)     \"falls asleep\"    Penicillins Unknown (comments)     Past Medical History:   Diagnosis Date    Benign prostatic hyperplasia with lower urinary tract symptoms     Cerebral infarction due to thrombosis of precerebral artery (HCC)     Diabetes [facility] 11/10/2011    Dizzy spells     Hemangioma     Hepatocellular carcinoma ([facility] 11/10/2011    History of MRI     Hydronephrosis 11/10/2011    Liver mass 11/10/2011    Multiple sclerosis ([facility] 11/10/2011    testing was negative for multiple sclerosis    [facility] 3/1/2012    Pure hypercholesterolemia 11/10/2011    Renal calculi 11/10/2011    UPJ (ureteropelvic junction) obstruction     Ureteral stent retained     Weak urinary stream      Past Surgical History:   Procedure Laterality Date    HX ORCHIECTOMY Left 1970    HX UROLOGICAL  4/20/16    Cystoscopy, Left retrograde pyelogramLeft robotic-assisted laparoscopic dismembered pyeloplasty with antegrade ureteral stent placementFlexible nephroscopy with stone removal     Family History   Problem Relation Age of Onset    Diabetes Sister      Social History   Substance Use Topics    Smoking status: Former Smoker     Types: Pipe    Smokeless tobacco: Never Used    Alcohol use No      Comment: quit smoking 1994       ROS       All other systems reviewed and are negative. Objective:  Vitals:    08/07/17 0851   BP: 162/90   Pulse: 73   Resp: 16   Temp: 97.6 °F (36.4 °C)   TempSrc: Oral   SpO2: 96%   Weight: 165 lb 9.6 oz (75.1 kg)   Height: 5' 3\" (1.6 m)   PainSc:   3   PainLoc: Hand                 alert, well appearing, and in no distress  Left thumber tender with mild ecchymossi        LABS     TESTS      Assessment/Plan:    Thumb inj 1 mo old  Will check x ray. If neg then just a sprain  If positive will refer to ortho    Lab review:         I have discussed the diagnosis with the patient and the intended plan as seen in the above orders. The patient has received an after-visit summary and questions were answered concerning future plans. I have discussed medication side effects and warnings with the patient as well. I have reviewed the plan of care with the patient, accepted their input and they are in agreement with the treatment goals.      Follow-up Disposition: Not on File

## 2017-08-07 NOTE — TELEPHONE ENCOUNTER
Called patient on the listed number. Verified patient using to identifiers. Informed patient that x-rays resulted signs of arthritis and Per Dr. Larisa Robison no treatment is recommended at this time. Confirmed patients upcoming appointment. All questions answered. No other concerns at this time. Pt states understanding and will comply.

## 2017-08-07 NOTE — PROGRESS NOTES
Suni Garza. is a 78 y.o. male presented to clinic with 3/10 left wrist and hand pain. Pt denies any other concerns at this time. 1. Have you been to the ER, urgent care clinic since your last visit? Hospitalized since your last visit? No    2. Have you seen or consulted any other health care providers outside of the [address] [place] [address] since your last visit? Include any pap smears or colon screening.  No     Learning Assessment 9/24/2015   PRIMARY LEARNER Patient   HIGHEST LEVEL OF EDUCATION - PRIMARY LEARNER  -   BARRIERS PRIMARY LEARNER -   CO-LEARNER CAREGIVER -   PRIMARY LANGUAGE ENGLISH   LEARNER PREFERENCE PRIMARY OTHER (COMMENT)   ANSWERED BY PATIENT   RELATIONSHIP SELF

## 2017-09-16 ENCOUNTER — DOCUMENTATION ONLY (OUTPATIENT)
Dept: FAMILY MEDICINE CLINIC | Age: 67
End: 2017-09-16

## 2017-10-03 ENCOUNTER — OFFICE VISIT (OUTPATIENT)
Dept: FAMILY MEDICINE CLINIC | Age: 67
End: 2017-10-03

## 2017-10-03 DIAGNOSIS — M54.50 ACUTE LOW BACK PAIN WITHOUT SCIATICA, UNSPECIFIED BACK PAIN LATERALITY: ICD-10-CM

## 2017-10-03 DIAGNOSIS — E11.00 TYPE 2 DIABETES MELLITUS WITH HYPEROSMOLARITY WITHOUT COMA, WITHOUT LONG-TERM CURRENT USE OF INSULIN (HCC): Primary | ICD-10-CM

## 2017-10-03 DIAGNOSIS — Z23 ENCOUNTER FOR IMMUNIZATION: ICD-10-CM

## 2017-10-03 LAB — HBA1C MFR BLD HPLC: 6.2 %

## 2017-10-03 RX ORDER — DIAZEPAM 5 MG/1
5 TABLET ORAL
Qty: 15 TAB | Refills: 0 | Status: SHIPPED | OUTPATIENT
Start: 2017-10-03 | End: 2019-04-03

## 2017-10-03 RX ORDER — OXYCODONE AND ACETAMINOPHEN 5; 325 MG/1; MG/1
1 TABLET ORAL
Qty: 20 TAB | Refills: 0 | Status: SHIPPED | OUTPATIENT
Start: 2017-10-03 | End: 2017-10-10

## 2017-10-03 NOTE — MR AVS SNAPSHOT
Visit Information Date & Time Provider Department Dept. Phone Encounter #  
 10/3/2017  2:30 PM Colby Vaughn [phone] [id number] Follow-up Instructions Return in about 3 months (around 1/3/2018) for Donna Ambriz next visit. Your Appointments 10/26/2017 11:00 AM  
[place] [address] [address] [facility]) Appt Note: Return in about 3 months (around 9/1/2017) for physical, labs prior, EKG next visit.; Pt had  No Show 9/5/17. Letter #1 sent 9/16/17. DMA/ms. ; pt r/s from 9/5/17  
 [address] [facility] [address] [facility] [address] [facility]  
  
   
 [address] [facility] [address]  
  
    
 8/30/2018 11:10 AM  
ESTABLISHED PATIENT with LETICIA [facility] ([facility]) Appt Note: Return for lasix renal scan in 1 yr and f/u with PA. Quang Giang [address] [facility], [place] [address] [facility] [address]  
  
   
 [address] [facility], [address] [place] [address] Upcoming Health Maintenance Date Due Pneumococcal 65+ Low/Medium Risk (2 of 2 - PPSV23) 10/10/2016 INFLUENZA AGE 9 TO ADULT 8/1/2017 MEDICARE YEARLY EXAM 8/17/2017 HEMOGLOBIN A1C Q6M 12/1/2017 LIPID PANEL Q1 12/5/2017 MICROALBUMIN Q1 5/4/2018 GLAUCOMA SCREENING Q2Y 8/16/2018 DTaP/Tdap/Td series (2 - Td) 11/10/2021 COLONOSCOPY 8/16/2026 Allergies as of 10/3/2017  Review Complete On: 8/31/2017 By: Hodan Arguello MD  
  
 Severity Noted Reaction Type Reactions Antihistamines - Alkylamine High   Anaphylaxis Stop Breathing Procaine High 07/24/2009    Other (comments) Heart slowed down Januvia [Sitagliptin]  04/13/2016    Diarrhea Morphine  04/13/2016    Other (comments) \"falls asleep\" Penicillins  04/13/2016    Unknown (comments) Current Immunizations  Reviewed on 5/1/2013 Name Date Influenza High Dose Vaccine PF 11/14/2016, 11/30/2015 Influenza Vaccine 10/6/2014, 11/1/2013 10:53 AM  
 Influenza Vaccine Split 10/3/2012 Influenza Vaccine Whole 9/10/2011 Pneumococcal Conjugate (PCV-13) 3/16/2015 TDAP Vaccine 11/10/2011 ZZZ-RETIRED (DO NOT USE) Pneumococcal Vaccine (Unspecified Type) 10/10/2011 Zoster Vaccine, Live 5/1/2013 Not reviewed this visit You Were Diagnosed With   
  
 Codes Comments Type 2 diabetes mellitus with hyperosmolarity without coma, without long-term current use of insulin (HCC)    -  Primary ICD-10-CM: E11.00 ICD-9-CM: 250.20 Acute low back pain without sciatica, unspecified back pain laterality     ICD-10-CM: M54.5 ICD-9-CM: 724.2 Vitals Smoking Status Former Smoker Preferred Pharmacy Pharmacy Name Phone [phone] [facility], [address] [place] [phone] Your Updated Medication List  
  
   
This list is accurate as of: 10/3/17  3:07 PM.  Always use your most recent med list.  
  
  
  
  
 ASPIRIN PO Take 325 mg by mouth. Blood-Glucose Meter monitoring kit Any reliable brand, dx 250.00 , use twice a day  
  
 diazePAM 5 mg tablet Commonly known as:  VALIUM Take 1 Tab by mouth every eight (8) hours as needed (muscle spasm). Max Daily Amount: 15 mg.  
  
 gabapentin 100 mg capsule Commonly known as:  NEURONTIN Take 1 Cap by mouth two (2) times a day. Indications: NEUROPATHIC PAIN  
  
 glucose blood VI test strips strip Commonly known as:  blood glucose test  
Use twice a day, any reliable brand to use with patients glucometer  Dx 250.00  
  
 insulin glargine 100 unit/mL (3 mL) Inpn Commonly known as:  LANTUSBASAGLAR  
32 units daily  Indications: type 2 diabetes mellitus Insulin Needles (Disposable) 29 gauge x 1/2\" Ndle Commonly known as:  BD INSULIN PEN NEEDLE UF ORIG  
USE DAILY WITH LANTUS Lancets Misc Twice a day oxyCODONE-acetaminophen 5-325 mg per tablet Commonly known as:  PERCOCET Take 1 Tab by mouth every six (6) hours as needed for Pain (1 to 2 tablets) for up to 7 days. Max Daily Amount: 4 Tabs. tamsulosin 0.4 mg capsule Commonly known as:  FLOMAX TAKE ONE CAPSULE BY MOUTH EVERY DAY AFTER DINNER Prescriptions Printed Refills  
 oxyCODONE-acetaminophen (PERCOCET) 5-325 mg per tablet 0 Sig: Take 1 Tab by mouth every six (6) hours as needed for Pain (1 to 2 tablets) for up to 7 days. Max Daily Amount: 4 Tabs. Class: Print Route: Oral  
 diazePAM (VALIUM) 5 mg tablet 0 Sig: Take 1 Tab by mouth every eight (8) hours as needed (muscle spasm). Max Daily Amount: 15 mg.  
 Class: Print Route: Oral  
  
We Performed the Following AMB POC HEMOGLOBIN A1C [51298 CPT(R)] Follow-up Instructions Return in about 3 months (around 1/3/2018) for Yamile Underwood next visit. Introducing [facility]! Dear Alec Mccormack: Thank you for requesting a [employer] account. Our records indicate that you already have an active [employer] account. You can access your account anytime at [email or web address] Did you know that you can access your hospital and ER discharge instructions at any time in [employer]? You can also review all of your test results from your hospital stay or ER visit. Additional Information If you have questions, please visit the Frequently Asked Questions section of the [employer] website at [email or web address] Remember, Haofangtong is NOT to be used for urgent needs. For medical emergencies, dial 911. Now available from your iPhone and Android! Please provide this summary of care documentation to your next provider. Your primary care clinician is listed as [address] [address]. If you have any questions after today's visit, please call [phone].

## 2017-10-03 NOTE — PROGRESS NOTES
Jennifer Darden is a 78 y.o.  male and presents with    Chief Complaint   Patient presents with    Diabetes    Back Pain           Subjective:  Severe lower left back pian actue. woring for [employer]       Diabetes Mellitus:  He has diabetes mellitus, and  diabetes. Diabetic ROS - diabetic diet compliance: compliant all of the time. Lab review: labs are reviewed, up to date and normal.       Additional Concerns:          Patient Active Problem List    Diagnosis Date Noted    ACP (advance care planning) 08/16/2016    Benign prostatic hyperplasia with lower urinary tract symptoms 02/04/2016    Type II diabetes mellitus ([facility] [address] 04/22/2015    Cerebral infarction ([facility] [address] 12/07/2012     Current Outpatient Prescriptions   Medication Sig Dispense Refill    oxyCODONE-acetaminophen (PERCOCET) 5-325 mg per tablet Take 1 Tab by mouth every six (6) hours as needed for Pain (1 to 2 tablets) for up to 7 days. Max Daily Amount: 4 Tabs. 20 Tab 0    diazePAM (VALIUM) 5 mg tablet Take 1 Tab by mouth every eight (8) hours as needed (muscle spasm). Max Daily Amount: 15 mg. 15 Tab 0    gabapentin (NEURONTIN) 100 mg capsule Take 1 Cap by mouth two (2) times a day. Indications: NEUROPATHIC PAIN 60 Cap 2    insulin glargine (LANTUS SOLOSTAR) 100 unit/mL (3 mL) pen 32 units daily  Indications: type 2 diabetes mellitus 15 Each 12    tamsulosin (FLOMAX) 0.4 mg capsule TAKE ONE CAPSULE BY MOUTH EVERY DAY AFTER DINNER 90 Cap 2    Insulin Needles, Disposable, (BD INSULIN PEN NEEDLE UF ORIG) 29 gauge x 1/2\" ndle USE DAILY WITH LANTUS 100 Pen Needle 12    Blood-Glucose Meter monitoring kit Any reliable brand, dx 250.00 , use twice a day 1 Kit 0    glucose blood VI test strips (BLOOD GLUCOSE TEST) strip Use twice a day, any reliable brand to use with patients glucometer  Dx 250.00 200 Strip 11    Lancets misc Twice a day 200 Each 11    ASPIRIN PO Take 325 mg by mouth.        Allergies   Allergen Reactions    Antihistamines - Alkylamine Anaphylaxis     Stop Breathing    Procaine Other (comments)     Heart slowed down    Januvia [Sitagliptin] Diarrhea    Morphine Other (comments)     \"falls asleep\"    Penicillins Unknown (comments)     Past Medical History:   Diagnosis Date    Benign prostatic hyperplasia with lower urinary tract symptoms     Cerebral infarction due to thrombosis of precerebral artery ([facility] [address]     Diabetes mellitus ([facility] [address] 11/10/2011    Dizzy spells     Hemangioma     Hepatocellular carcinoma ([facility] [address] 11/10/2011    History of MRI     Hydronephrosis 11/10/2011    Liver mass 11/10/2011    Multiple sclerosis ([facility] [address] 11/10/2011    testing was negative for multiple sclerosis    Neuropathy 3/1/2012    Pure hypercholesterolemia 11/10/2011    Renal calculi 11/10/2011    UPJ (ureteropelvic junction) obstruction     Ureteral stent retained     Weak urinary stream      Past Surgical History:   Procedure Laterality Date    HX ORCHIECTOMY Left 1970    HX UROLOGICAL  4/20/16    Cystoscopy, Left retrograde pyelogramLeft robotic-assisted laparoscopic dismembered pyeloplasty with antegrade ureteral stent placementFlexible nephroscopy with stone removal     Family History   Problem Relation Age of Onset    Diabetes Sister      Social History   Substance Use Topics    Smoking status: Former Smoker     Types: Pipe    Smokeless tobacco: Never Used    Alcohol use No      Comment: quit smoking 1994       ROS       All other systems reviewed and are negative. Objective: There were no vitals filed for this visit.               alert, well appearing, and in no distress and oriented to person, place, and time  Chest - clear to auscultation, no wheezes, rales or rhonchi, symmetric air entry  Heart - normal rate, regular rhythm, normal S1, S2, no murmurs, rubs, clicks or gallops  Abdomen - soft, nontender, nondistended, no masses or organomegaly        LABS   aic  6.2  TESTS      Assessment/Plan:    Diabetes - well controlled  Low back pain  Probably from working [employer] give a few percocet and valium  No driving for 8 hours after taking meds      Lab review: labs are reviewed, up to date and normal    Diagnoses and all orders for this visit:    1. Type 2 diabetes mellitus with hyperosmolarity without coma, without long-term current use of insulin (HCC)  -     AMB POC HEMOGLOBIN A1C    2. Acute low back pain without sciatica, unspecified back pain laterality    Other orders  -     oxyCODONE-acetaminophen (PERCOCET) 5-325 mg per tablet; Take 1 Tab by mouth every six (6) hours as needed for Pain (1 to 2 tablets) for up to 7 days. Max Daily Amount: 4 Tabs. -     diazePAM (VALIUM) 5 mg tablet; Take 1 Tab by mouth every eight (8) hours as needed (muscle spasm). Max Daily Amount: 15 mg. I have discussed the diagnosis with the patient and the intended plan as seen in the above orders. The patient has received an after-visit summary and questions were answered concerning future plans. I have discussed medication side effects and warnings with the patient as well. I have reviewed the plan of care with the patient, accepted their input and they are in agreement with the treatment goals.      Follow-up Disposition: Not on File

## 2017-10-19 ENCOUNTER — OFFICE VISIT (OUTPATIENT)
Dept: FAMILY MEDICINE CLINIC | Age: 67
End: 2017-10-19

## 2017-10-19 VITALS
HEART RATE: 63 BPM | WEIGHT: 166.8 LBS | OXYGEN SATURATION: 97 % | DIASTOLIC BLOOD PRESSURE: 89 MMHG | BODY MASS INDEX: 29.55 KG/M2 | SYSTOLIC BLOOD PRESSURE: 158 MMHG | TEMPERATURE: 97 F | HEIGHT: 63 IN | RESPIRATION RATE: 16 BRPM

## 2017-10-19 DIAGNOSIS — R03.0 ELEVATED BP WITHOUT DIAGNOSIS OF HYPERTENSION: Primary | ICD-10-CM

## 2017-10-19 NOTE — MR AVS SNAPSHOT
Visit Information Date & Time Provider Department Dept. Phone Encounter #  
 10/19/2017  2:00 PM Harpreet Dixon, [address] [facility] [phone] [id number] Follow-up Instructions Return in about 3 months (around 1/19/2018) for rov. Follow-up and Disposition History Your Appointments 10/26/2017 11:00 AM  
ROUT[place] [address] [address]) Appt Note: Return in about 3 months (around 9/1/2017) for physical, labs prior, EKG next visit.; Pt had  No Show 9/5/17. Letter #1 sent 9/16/17. DMA/ms. ; pt r/s from 9/5/17  
 [address] [facility] [address]  
  
   
 [address] [facility] 1334  
  
    
 1/3/2018 10:30 AM  
[place] [address] [address]) Appt Note: Return in about 3 months (around 1/3/2018) for Alberto Forbes next visit. [address] [facility] [address]  
  
   
 [address] [facility] [address]  
  
    
 8/30/2018 11:10 AM  
ESTABLISHED PATIENT with Stefano Skiff, PA [facility] ([facility]) Appt Note: Return for lasix renal scan in 1 yr and f/u with PA. Kirk Mar [address] [facility], [place] [address] [facility] [address]  
  
   
 [address] [facility], [address] [place] [id number] Upcoming Health Maintenance Date Due Pneumococcal 65+ Low/Medium Risk (2 of 2 - PPSV23) 10/10/2016 MEDICARE YEARLY EXAM 8/17/2017 LIPID PANEL Q1 12/5/2017 HEMOGLOBIN A1C Q6M 4/3/2018 MICROALBUMIN Q1 5/4/2018 GLAUCOMA SCREENING Q2Y 8/16/2018 DTaP/Tdap/Td series (2 - Td) 11/10/2021 COLONOSCOPY 8/16/2026 Allergies as of 10/19/2017  Review Complete On: 10/19/2017 By: Harpreet Jacque., DO Severity Noted Reaction Type Reactions Antihistamines - Alkylamine High   Anaphylaxis Stop Breathing Procaine High 07/24/2009    Other (comments) Heart slowed down Januvia [Sitagliptin]  04/13/2016    Diarrhea Morphine  04/13/2016    Other (comments) \"falls asleep\" Penicillins  04/13/2016    Unknown (comments) Current Immunizations  Reviewed on 10/3/2017 Name Date Influenza High Dose Vaccine PF 10/3/2017, 11/14/2016, 11/30/2015 Influenza Vaccine 10/6/2014, 11/1/2013 10:53 AM  
 Influenza Vaccine Split 10/3/2012 Influenza Vaccine Whole 9/10/2011 Pneumococcal Conjugate (PCV-13) 3/16/2015 TDAP Vaccine 11/10/2011 ZZZ-RETIRED (DO NOT USE) Pneumococcal Vaccine (Unspecified Type) 10/10/2011 Zoster Vaccine, Live 5/1/2013 Not reviewed this visit You Were Diagnosed With   
  
 Codes Comments Elevated BP without diagnosis of hypertension    -  Primary ICD-10-CM: R03.0 ICD-9-CM: 796.2 Vitals BP Pulse Temp Resp Height(growth percentile) Weight(growth percentile) 158/89 (BP 1 Location: Left arm, BP Patient Position: Sitting) 63 97 °F (36.1 °C) (Oral) 16 5' 3\" (1.6 m) 166 lb 12.8 oz (75.7 kg) SpO2 BMI Smoking Status 97% 29.55 kg/m2 Former Smoker BMI and BSA Data Body Mass Index Body Surface Area  
 29.55 kg/m 2 1.83 m 2 Preferred Pharmacy Pharmacy Name Phone [phone] [phone] Your Updated Medication List  
  
   
This list is accurate as of: 10/19/17  2:41 PM.  Always use your most recent med list.  
  
  
  
  
 ASPIRIN PO Take 325 mg by mouth. Blood-Glucose Meter monitoring kit Any reliable brand, dx 250.00 , use twice a day  
  
 diazePAM 5 mg tablet Commonly known as:  VALIUM Take 1 Tab by mouth every eight (8) hours as needed (muscle spasm). Max Daily Amount: 15 mg.  
  
 gabapentin 100 mg capsule Commonly known as:  NEURONTIN Take 1 Cap by mouth two (2) times a day.  Indications: NEUROPATHIC PAIN  
  
 glucose blood VI test strips strip Commonly known as:  blood glucose test  
Use twice a day, any reliable brand to use with patients glucometer  Dx 250.00  
  
 insulin glargine 100 unit/mL (3 mL) Inpn Commonly known as:  LANDOMBASAGLAR  
32 units daily  Indications: type 2 diabetes mellitus Insulin Needles (Disposable) 29 gauge x 1/2\" Ndle Commonly known as:  BD INSULIN PEN NEEDLE UF ORIG  
USE DAILY WITH LANTUS Lancets Misc Twice a day  
  
 tamsulosin 0.4 mg capsule Commonly known as:  FLOMAX TAKE ONE CAPSULE BY MOUTH EVERY DAY AFTER DINNER Follow-up Instructions Return in about 3 months (around 1/19/2018) for rov. Patient Instructions DASH Diet: Care Instructions Your Care Instructions The DASH diet is an eating plan that can help lower your blood pressure. DASH stands for Dietary Approaches to Stop Hypertension. Hypertension is high blood pressure. The DASH diet focuses on eating foods that are high in calcium, potassium, and magnesium. These nutrients can lower blood pressure. The foods that are highest in these nutrients are fruits, vegetables, low-fat dairy products, nuts, seeds, and legumes. But taking calcium, potassium, and magnesium supplements instead of eating foods that are high in those nutrients does not have the same effect. The DASH diet also includes whole grains, fish, and poultry. The DASH diet is one of several lifestyle changes your doctor may recommend to lower your high blood pressure. Your doctor may also want you to decrease the amount of sodium in your diet. Lowering sodium while following the DASH diet can lower blood pressure even further than just the DASH diet alone. Follow-up care is a key part of your treatment and safety. Be sure to make and go to all appointments, and call your doctor if you are having problems. It's also a good idea to know your test results and keep a list of the medicines you take. How can you care for yourself at home? Following the DASH diet · Eat 4 to 5 servings of fruit each day. A serving is 1 medium-sized piece of fruit, ½ cup chopped or canned fruit, 1/4 cup dried fruit, or 4 ounces (½ cup) of fruit juice. Choose fruit more often than fruit juice. · Eat 4 to 5 servings of vegetables each day. A serving is 1 cup of lettuce or raw leafy vegetables, ½ cup of chopped or cooked vegetables, or 4 ounces (½ cup) of vegetable juice. Choose vegetables more often than vegetable juice. · Get 2 to 3 servings of low-fat and fat-free dairy each day. A serving is 8 ounces of milk, 1 cup of yogurt, or 1 ½ ounces of cheese. · Eat 6 to 8 servings of grains each day. A serving is 1 slice of bread, 1 ounce of dry cereal, or ½ cup of cooked rice, pasta, or cooked cereal. Try to choose whole-grain products as much as possible. · Limit lean meat, poultry, and fish to 2 servings each day. A serving is 3 ounces, about the size of a deck of cards. · Eat 4 to 5 servings of nuts, seeds, and legumes (cooked dried beans, lentils, and split peas) each week. A serving is 1/3 cup of nuts, 2 tablespoons of seeds, or ½ cup of cooked beans or peas. · Limit fats and oils to 2 to 3 servings each day. A serving is 1 teaspoon of vegetable oil or 2 tablespoons of salad dressing. · Limit sweets and added sugars to 5 servings or less a week. A serving is 1 tablespoon jelly or jam, ½ cup sorbet, or 1 cup of lemonade. · Eat less than 2,300 milligrams (mg) of sodium a day. If you limit your sodium to 1,500 mg a day, you can lower your blood pressure even more. Tips for success · Start small. Do not try to make dramatic changes to your diet all at once. You might feel that you are missing out on your favorite foods and then be more likely to not follow the plan. Make small changes, and stick with them. Once those changes become habit, add a few more changes. · Try some of the following: ¨ Make it a goal to eat a fruit or vegetable at every meal and at snacks. This will make it easy to get the recommended amount of fruits and vegetables each day. ¨ Try yogurt topped with fruit and nuts for a snack or healthy dessert. ¨ Add lettuce, tomato, cucumber, and onion to sandwiches. ¨ Combine a ready-made pizza crust with low-fat mozzarella cheese and lots of vegetable toppings. Try using tomatoes, squash, spinach, broccoli, carrots, cauliflower, and onions. ¨ Have a variety of cut-up vegetables with a low-fat dip as an appetizer instead of chips and dip. ¨ Sprinkle sunflower seeds or chopped almonds over salads. Or try adding chopped walnuts or almonds to cooked vegetables. ¨ Try some vegetarian meals using beans and peas. Add garbanzo or kidney beans to salads. Make burritos and tacos with mashed julio beans or black beans. Where can you learn more? Go to http://tonyRevel Systemsnakia.info/. Enter A934 in the search box to learn more about \"DASH Diet: Care Instructions. \" Current as of: April 3, 2017 Content Version: 11.3 © 6424-8503 [employer]. Care instructions adapted under license by [employer] (which disclaims liability or warranty for this information). If you have questions about a medical condition or this instruction, always ask your healthcare professional. [place] [address] any warranty or liability for your use of this information. DASH Diet: Care Instructions Your Care Instructions The DASH diet is an eating plan that can help lower your blood pressure. DASH stands for Dietary Approaches to Stop Hypertension. Hypertension is high blood pressure. The DASH diet focuses on eating foods that are high in calcium, potassium, and magnesium. These nutrients can lower blood pressure.  The foods that are highest in these nutrients are fruits, vegetables, low-fat dairy products, nuts, seeds, and legumes. But taking calcium, potassium, and magnesium supplements instead of eating foods that are high in those nutrients does not have the same effect. The DASH diet also includes whole grains, fish, and poultry. The DASH diet is one of several lifestyle changes your doctor may recommend to lower your high blood pressure. Your doctor may also want you to decrease the amount of sodium in your diet. Lowering sodium while following the DASH diet can lower blood pressure even further than just the DASH diet alone. Follow-up care is a key part of your treatment and safety. Be sure to make and go to all appointments, and call your doctor if you are having problems. It's also a good idea to know your test results and keep a list of the medicines you take. How can you care for yourself at home? Following the DASH diet · Eat 4 to 5 servings of fruit each day. A serving is 1 medium-sized piece of fruit, ½ cup chopped or canned fruit, 1/4 cup dried fruit, or 4 ounces (½ cup) of fruit juice. Choose fruit more often than fruit juice. · Eat 4 to 5 servings of vegetables each day. A serving is 1 cup of lettuce or raw leafy vegetables, ½ cup of chopped or cooked vegetables, or 4 ounces (½ cup) of vegetable juice. Choose vegetables more often than vegetable juice. · Get 2 to 3 servings of low-fat and fat-free dairy each day. A serving is 8 ounces of milk, 1 cup of yogurt, or 1 ½ ounces of cheese. · Eat 6 to 8 servings of grains each day. A serving is 1 slice of bread, 1 ounce of dry cereal, or ½ cup of cooked rice, pasta, or cooked cereal. Try to choose whole-grain products as much as possible. · Limit lean meat, poultry, and fish to 2 servings each day. A serving is 3 ounces, about the size of a deck of cards. · Eat 4 to 5 servings of nuts, seeds, and legumes (cooked dried beans, lentils, and split peas) each week.  A serving is 1/3 cup of nuts, 2 tablespoons of seeds, or ½ cup of cooked beans or peas. · Limit fats and oils to 2 to 3 servings each day. A serving is 1 teaspoon of vegetable oil or 2 tablespoons of salad dressing. · Limit sweets and added sugars to 5 servings or less a week. A serving is 1 tablespoon jelly or jam, ½ cup sorbet, or 1 cup of lemonade. · Eat less than 2,300 milligrams (mg) of sodium a day. If you limit your sodium to 1,500 mg a day, you can lower your blood pressure even more. Tips for success · Start small. Do not try to make dramatic changes to your diet all at once. You might feel that you are missing out on your favorite foods and then be more likely to not follow the plan. Make small changes, and stick with them. Once those changes become habit, add a few more changes. · Try some of the following: ¨ Make it a goal to eat a fruit or vegetable at every meal and at snacks. This will make it easy to get the recommended amount of fruits and vegetables each day. ¨ Try yogurt topped with fruit and nuts for a snack or healthy dessert. ¨ Add lettuce, tomato, cucumber, and onion to sandwiches. ¨ Combine a ready-made pizza crust with low-fat mozzarella cheese and lots of vegetable toppings. Try using tomatoes, squash, spinach, broccoli, carrots, cauliflower, and onions. ¨ Have a variety of cut-up vegetables with a low-fat dip as an appetizer instead of chips and dip. ¨ Sprinkle sunflower seeds or chopped almonds over salads. Or try adding chopped walnuts or almonds to cooked vegetables. ¨ Try some vegetarian meals using beans and peas. Add garbanzo or kidney beans to salads. Make burritos and tacos with mashed julio beans or black beans. Where can you learn more? Go to http://tony-nakia.info/. Enter J560 in the search box to learn more about \"DASH Diet: Care Instructions. \" Current as of: April 3, 2017 Content Version: 11.3 © 2530-9019 [employer].  Care instructions adapted under license by [address] (which disclaims liability or warranty for this information). If you have questions about a medical condition or this instruction, always ask your healthcare professional. [address] any warranty or liability for your use of this information. Introducing [facility]! Dear Fabián Wiggins: Thank you for requesting a Snootlab account. Our records indicate that you already have an active Snootlab account. You can access your account anytime at [email or web address] Did you know that you can access your hospital and ER discharge instructions at any time in Snootlab? You can also review all of your test results from your hospital stay or ER visit. Additional Information If you have questions, please visit the Frequently Asked Questions section of the Snootlab website at [email or web address] Remember, Snootlab is NOT to be used for urgent needs. For medical emergencies, dial 911. Now available from your iPhone and Android! Please provide this summary of care documentation to your next provider. Your primary care clinician is listed as [id number] [facility]. If you have any questions after today's visit, please call [phone].

## 2017-10-19 NOTE — PROGRESS NOTES
Jennifer Darden is a 78 y.o.  male and presents with    Chief Complaint   Patient presents with    Hypertension           Subjective:  Was having surgery yesterday. Noted to have a bit elevated bp  Here for recheck        Additional Concerns:          Patient Active Problem List    Diagnosis Date Noted    ACP (advance care planning) 08/16/2016    Benign prostatic hyperplasia with lower urinary tract symptoms 02/04/2016    Type II diabetes mellitus ([facility] 04/22/2015    Cerebral infarction ([facility] 12/07/2012       ROS       All other systems reviewed and are negative. Objective:  Vitals:    10/19/17 1427   BP: 158/89   Pulse: 63   Resp: 16   Temp: 97 °F (36.1 °C)   TempSrc: Oral   SpO2: 97%   Weight: 166 lb 12.8 oz (75.7 kg)   Height: 5' 3\" (1.6 m)   PainSc:   3   PainLoc: Eye                 alert, well appearing, and in no distress, oriented to person, place, and time and normal appearing weight  Chest - clear to auscultation, no wheezes, rales or rhonchi, symmetric air entry  Heart - normal rate, regular rhythm, normal S1, S2, no murmurs, rubs, clicks or gallops  Abdomen - soft, nontender, nondistended, no masses or organomegaly        LABS     TESTS      Assessment/Plan:    Hypertension - control uncertain    Lab review: labs are reviewed, up to date and normal    Diagnoses and all orders for this visit:    1. Elevated BP without diagnosis of hypertension        I have discussed the diagnosis with the patient and the intended plan as seen in the above orders. The patient has received an after-visit summary and questions were answered concerning future plans. I have discussed medication side effects and warnings with the patient as well. I have reviewed the plan of care with the patient, accepted their input and they are in agreement with the treatment goals. Follow-up Disposition:  Return in about 3 months (around 1/19/2018) for lilia.

## 2017-10-19 NOTE — PATIENT INSTRUCTIONS

## 2017-10-19 NOTE — PROGRESS NOTES
Zachery Tang. is a 78 y.o. male presents today for follow up on his blood pressure. Pt is in Room # 6        1. Have you been to the ER, urgent care clinic since your last visit? Hospitalized since your last visit? No    2. Have you seen or consulted any other health care providers outside of the [address] [place] [address] since your last visit? Include any pap smears or colon screening.  Yes When: yesterday Where: [address] [facility] Reason for visit: cataract sx right eye

## 2018-01-17 ENCOUNTER — DOCUMENTATION ONLY (OUTPATIENT)
Dept: FAMILY MEDICINE CLINIC | Age: 68
End: 2018-01-17

## 2018-01-17 NOTE — LETTER
1/17/2018 [address] [place] [id number] Dear Mr. Pryor Rinne.,  
 
We had an appointment reserved for you 1/3/2018 and were concerned when you did not show or call within 24 hours to cancel the appointment. Our policy is to call patients two days prior to their appointment to remind them of the date and time. We perform these calls as a courtesy to our patients and to allow us the opportunity to rebook the time slot should the appointment not be necessary. Recognizing that everyones time is valuable and that appointment time is limited, we ask that you provide 24 hours notice if you are unable to keep your appointment. Please call us at your earliest convenience to reschedule your appointment as your provider felt it was important to see you. Thank you for your anticipated cooperation. The scheduling staff: 
 
[address] [place] [address],[address] [place] [address] [phone]

## 2018-11-12 ENCOUNTER — PATIENT MESSAGE (OUTPATIENT)
Dept: INTERNAL MEDICINE CLINIC | Age: 68
End: 2018-11-12

## 2018-11-12 ENCOUNTER — DOCUMENTATION ONLY (OUTPATIENT)
Dept: INTERNAL MEDICINE CLINIC | Age: 68
End: 2018-11-12

## 2019-03-27 ENCOUNTER — OFFICE VISIT (OUTPATIENT)
Dept: FAMILY MEDICINE CLINIC | Age: 69
End: 2019-03-27

## 2019-03-27 VITALS
BODY MASS INDEX: 28.95 KG/M2 | HEIGHT: 63 IN | RESPIRATION RATE: 18 BRPM | OXYGEN SATURATION: 96 % | HEART RATE: 90 BPM | WEIGHT: 163.4 LBS | SYSTOLIC BLOOD PRESSURE: 130 MMHG | TEMPERATURE: 97.5 F | DIASTOLIC BLOOD PRESSURE: 84 MMHG

## 2019-03-27 DIAGNOSIS — R42 DIZZINESS: Primary | ICD-10-CM

## 2019-03-27 RX ORDER — MECLIZINE HYDROCHLORIDE 25 MG/1
25 TABLET ORAL
Qty: 30 TAB | Refills: 1 | Status: SHIPPED | OUTPATIENT
Start: 2019-03-27 | End: 2019-04-06

## 2019-03-27 NOTE — PROGRESS NOTES
1. Have you been to the ER, urgent care clinic since your last visit? Hospitalized since your last visit? No 
 
2. Have you seen or consulted any other health care providers outside of the [address] [place] [address] since your last visit? Include any pap smears or colon screening.  No

## 2019-03-27 NOTE — PROGRESS NOTES
Josselyn Velazquez. is a 70 y.o.  male and presents with Chief Complaint Patient presents with  Dizziness Subjective: 
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 Had sudden attack of dizziness, nausea. near syncope. Called an ambulance. bp was fine. Glucose normal. Took a taxi home Then slowly got better. This happend this morning Additional Concerns:   
 
 
 
Patient Active Problem List  
 Diagnosis Date Noted  ACP (advance care planning) 08/16/2016  Benign prostatic hyperplasia with lower urinary tract symptoms 02/04/2016  Type II diabetes mellitus ([facility] [address] 04/22/2015  Cerebral infarction ([facility] [address] 12/07/2012 Current Outpatient Medications Medication Sig Dispense Refill  diazePAM (VALIUM) 5 mg tablet Take 1 Tab by mouth every eight (8) hours as needed (muscle spasm). Max Daily Amount: 15 mg. 15 Tab 0  
 gabapentin (NEURONTIN) 100 mg capsule Take 1 Cap by mouth two (2) times a day. Indications: NEUROPATHIC PAIN 60 Cap 2  
 insulin glargine (LANTUS SOLOSTAR) 100 unit/mL (3 mL) pen 32 units daily  Indications: type 2 diabetes mellitus 15 Each 12  
 tamsulosin (FLOMAX) 0.4 mg capsule TAKE ONE CAPSULE BY MOUTH EVERY DAY AFTER DINNER 90 Cap 2  
 Insulin Needles, Disposable, (BD INSULIN PEN NEEDLE UF ORIG) 29 gauge x 1/2\" ndle USE DAILY WITH LANTUS 100 Pen Needle 12  Blood-Glucose Meter monitoring kit Any reliable brand, dx 250.00 , use twice a day 1 Kit 0  
 glucose blood VI test strips (BLOOD GLUCOSE TEST) strip Use twice a day, any reliable brand to use with patients glucometer  Dx 250.00 200 Strip 11  Lancets misc Twice a day 200 Each 11  
 ASPIRIN PO Take 325 mg by mouth. Allergies Allergen Reactions  Antihistamines - Alkylamine Anaphylaxis Stop Breathing  Procaine Other (comments) Heart slowed down  Januvia [Sitagliptin] Diarrhea  Morphine Other (comments) \"falls asleep\"  Penicillins Unknown (comments) Past Medical History: Diagnosis Date  Benign prostatic hyperplasia with lower urinary tract symptoms  Cerebral infarction due to thrombosis of precerebral artery ([facility] [address]  Diabetes mellitus ([facility] [address] 11/10/2011  Dizzy spells  Hemangioma  Hepatocellular carcinoma ([facility] [address] 11/10/2011  History of MRI  Hydronephrosis 11/10/2011  Liver mass 11/10/2011  Multiple sclerosis ([facility] [address] 11/10/2011  
 testing was negative for multiple sclerosis  Neuropathy 3/1/2012  Pure hypercholesterolemia 11/10/2011  Renal calculi 11/10/2011  UPJ (ureteropelvic junction) obstruction  Ureteral stent retained  Weak urinary stream   
 
Past Surgical History:  
Procedure Laterality Date Trosving 86  HX UROLOGICAL  4/20/16 Cystoscopy, Left retrograde pyelogramLeft robotic-assisted laparoscopic dismembered pyeloplasty with antegrade ureteral stent placementFlexible nephroscopy with stone removal  
 
Family History Problem Relation Age of Onset  Diabetes Sister Social History Tobacco Use  Smoking status: Former Smoker Types: Pipe  Smokeless tobacco: Never Used Substance Use Topics  Alcohol use: No  
  Alcohol/week: 0.0 oz  
  Comment: quit smoking 1994 ROS All other systems reviewed and are negative. Objective: 
Vitals:  
 03/27/19 1330 BP: 130/84 Pulse: 90 Resp: 18 Temp: 97.5 °F (36.4 °C) TempSrc: Oral  
SpO2: 96% Weight: 163 lb 6.4 oz (74.1 kg) Height: 5' 3\" (1.6 m) PainSc:   0 - No pain  
 
 
 
 
 
 
 
alert, well appearing, and in no distress and oriented to person, place, and time Eyes - pupils equal and reactive, extraocular eye movements intact Ears - bilateral TM's and external ear canals normal 
Nose - normal and patent, no erythema, discharge or polyps Mouth - mucous membranes moist, pharynx normal without lesions Neck - supple, no significant adenopathy Lymphatics - no palpable lymphadenopathy, no hepatosplenomegaly Chest - clear to auscultation, no wheezes, rales or rhonchi, symmetric air entry Heart - normal rate, regular rhythm, normal S1, S2, no murmurs, rubs, clicks or gallops Abdomen - soft, nontender, nondistended, no masses or organomegaly 
posotive halpike [employer] LABS TESTS Assessment/Plan:   
Vertigo. Probably viral labrynthis vs BPPV vsMS vs acoustic neuroma vs meniers 
labryn and bppv most likely Will try antivert for next wk Should not be driving until cleared Lab review: labs are reviewed, up to date and normal 
 
Diagnoses and all orders for this visit: 1. Dizziness I have discussed the diagnosis with the patient and the intended plan as seen in the above orders. The patient has received an after-visit summary and questions were answered concerning future plans. I have discussed medication side effects and warnings with the patient as well. I have reviewed the plan of care with the patient, accepted their input and they are in agreement with the treatment goals. Follow-up and Dispositions · Return in about 1 week (around 4/3/2019) for EOV.

## 2019-03-27 NOTE — LETTER
NOTIFICATION RETURN TO WORK / SCHOOL 
 
3/27/2019 1:43 PM 
 
Mr. [address] [facility] [address] [id number] To Whom It May Concern: 
 
Meghan Tyler. is currently under the care of [address] [place] [address]. No driving or work until cleared If there are questions or concerns please have the patient contact our office.  
 
 
 
Sincerely, 
 
 
Luis Knight., DO

## 2019-04-03 ENCOUNTER — OFFICE VISIT (OUTPATIENT)
Dept: FAMILY MEDICINE CLINIC | Age: 69
End: 2019-04-03

## 2019-04-03 VITALS
HEIGHT: 63 IN | RESPIRATION RATE: 19 BRPM | DIASTOLIC BLOOD PRESSURE: 71 MMHG | OXYGEN SATURATION: 94 % | BODY MASS INDEX: 29.06 KG/M2 | WEIGHT: 164 LBS | HEART RATE: 63 BPM | TEMPERATURE: 97.7 F | SYSTOLIC BLOOD PRESSURE: 118 MMHG

## 2019-04-03 DIAGNOSIS — E11.9 TYPE 2 DIABETES MELLITUS WITHOUT COMPLICATION, WITH LONG-TERM CURRENT USE OF INSULIN (HCC): Primary | ICD-10-CM

## 2019-04-03 DIAGNOSIS — R42 DIZZINESS: ICD-10-CM

## 2019-04-03 DIAGNOSIS — Z79.4 TYPE 2 DIABETES MELLITUS WITHOUT COMPLICATION, WITH LONG-TERM CURRENT USE OF INSULIN (HCC): Primary | ICD-10-CM

## 2019-04-03 LAB — HBA1C MFR BLD HPLC: 6.1 %

## 2019-04-03 RX ORDER — GABAPENTIN 100 MG/1
100 CAPSULE ORAL 2 TIMES DAILY
COMMUNITY

## 2019-04-03 RX ORDER — MECLIZINE HYDROCHLORIDE 25 MG/1
25 TABLET ORAL 3 TIMES DAILY
Qty: 100 TAB | Refills: 2 | Status: SHIPPED | OUTPATIENT
Start: 2019-04-03 | End: 2019-04-15

## 2019-04-03 RX ORDER — INSULIN GLARGINE 100 [IU]/ML
30 INJECTION, SOLUTION SUBCUTANEOUS DAILY
COMMUNITY
End: 2019-04-15

## 2019-04-03 NOTE — PROGRESS NOTES
Room #      SUBJECTIVE:    Stacy Price is a 70 y.o. male who presents today for c/o dizziness    1. Have you been to the ER, urgent care clinic since your last visit? Hospitalized since your last visit? NO    2. Have you seen or consulted any other health care providers outside of the [address] [place] [address] since your last visit? Include any pap smears or colon screening. NO  When :  Reason:    Health Maintenance reviewed  Yes    Health Maintenance Due   Topic Date Due    Shingrix Vaccine Age 49> (1 of 2) 11/01/1999    Pneumococcal 65+ years (2 of 2 - PPSV23) 10/10/2016    LIPID PANEL Q1  12/05/2017    MEDICARE YEARLY EXAM  03/14/2018    HEMOGLOBIN A1C Q6M  04/03/2018    MICROALBUMIN Q1  05/04/2018    GLAUCOMA SCREENING Q2Y  08/16/2018

## 2019-04-03 NOTE — PROGRESS NOTES
Mitzy Rodriguez. is a 70 y.o.  male and presents with    Chief Complaint   Patient presents with    Dizziness           Subjective:  Has had dizzy spells for over a week  If he takes antiverft regularly is ok but if he stopps it the dzzy comes back        Additional Concerns:          Patient Active Problem List    Diagnosis Date Noted    ACP (advance care planning) 08/16/2016    Benign prostatic hyperplasia with lower urinary tract symptoms 02/04/2016    Type II diabetes mellitus ([facility] 04/22/2015    Cerebral infarction ([facility] 12/07/2012     Current Outpatient Medications   Medication Sig Dispense Refill    insulin glargine (LANTUS SOLOSTAR U-100 INSULIN) 100 unit/mL (3 mL) inpn 30 Units by SubCUTAneous route daily.  gabapentin (NEURONTIN) 100 mg capsule Take 100 mg by mouth two (2) times a day.  meclizine (ANTIVERT) 25 mg tablet Take 1 Tab by mouth three (3) times daily as needed for Dizziness for up to 10 days. 30 Tab 1    tamsulosin (FLOMAX) 0.4 mg capsule TAKE ONE CAPSULE BY MOUTH EVERY DAY AFTER DINNER 90 Cap 2    ASPIRIN PO Take 325 mg by mouth.        Allergies   Allergen Reactions    Antihistamines - Alkylamine Anaphylaxis     Stop Breathing    Procaine Other (comments)     Heart slowed down    Januvia [Sitagliptin] Diarrhea    Morphine Other (comments)     \"falls asleep\"    Penicillins Unknown (comments)     Past Medical History:   Diagnosis Date    Benign prostatic hyperplasia with lower urinary tract symptoms     Cerebral infarction due to thrombosis of precerebral artery ([facility]     Diabetes mellitus ([facility] 11/10/2011    Dizzy spells     Hemangioma     Hepatocellular carcinoma ([facility] 11/10/2011    History of MRI     Hydronephrosis 11/10/2011    Liver mass 11/10/2011    Multiple sclerosis ([facility] 11/10/2011    testing was negative for multiple sclerosis    Neuropathy 3/1/2012    Pure hypercholesterolemia 11/10/2011    Renal calculi 11/10/2011    UPJ (ureteropelvic junction) obstruction     Ureteral stent retained     Weak urinary stream      Past Surgical History:   Procedure Laterality Date    HX ORCHIECTOMY Left 1970    HX UROLOGICAL  4/20/16    Cystoscopy, Left retrograde pyelogramLeft robotic-assisted laparoscopic dismembered pyeloplasty with antegrade ureteral stent placementFlexible nephroscopy with stone removal     Family History   Problem Relation Age of Onset    Diabetes Sister      Social History     Tobacco Use    Smoking status: Former Smoker     Types: Pipe    Smokeless tobacco: Never Used   Substance Use Topics    Alcohol use: No     Alcohol/week: 0.0 oz     Comment: quit smoking 1994       ROS       All other systems reviewed and are negative. Objective:  Vitals:    04/03/19 0957   BP: 118/71   Pulse: 63   Resp: 19   Temp: 97.7 °F (36.5 °C)   TempSrc: Oral   SpO2: 94%   Weight: 164 lb (74.4 kg)   Height: 5' 3\" (1.6 m)                 alert, well appearing, and in no distress, oriented to person, place, and time and normal appearing weight  Eyes - pupils equal and reactive, extraocular eye movements intact, no nystagmus  Neck - supple, no significant adenopathy  Chest - clear to auscultation, no wheezes, rales or rhonchi, symmetric air entry  Heart - normal rate, regular rhythm, normal S1, S2, no murmurs, rubs, clicks or gallops  Abdomen - soft, nontender, nondistended, no masses or organomegaly        LABS   aic  6 today  TESTS      Assessment/Plan:    Diabetes - well controlled, stable  Vertigo -- probably lab rynthitis. Has prev been eval for mul scler-- by jyoti. Maybe BPPV. Doubt meniers. Will ask jyoti to eval for vertigo and give 2nd opinion    labs are reviewed, up to date and normalLab review: labs are reviewed, up to date and normal    Diagnoses and all orders for this visit:    1.  Type 2 diabetes mellitus without complication, with long-term current use of insulin (HCC)  -     AMB POC HEMOGLOBIN A1C          I have discussed the diagnosis with the patient and the intended plan as seen in the above orders. The patient has received an after-visit summary and questions were answered concerning future plans. I have discussed medication side effects and warnings with the patient as well. I have reviewed the plan of care with the patient, accepted their input and they are in agreement with the treatment goals.

## 2019-04-08 ENCOUNTER — TELEPHONE (OUTPATIENT)
Dept: FAMILY MEDICINE CLINIC | Age: 69
End: 2019-04-08

## 2019-04-08 NOTE — TELEPHONE ENCOUNTER
Patient called and would like to ask when can he start driving again. He explained that he was told not to drive due to his health issue. No further information was given was asked. Asking to be called back.

## 2019-04-09 ENCOUNTER — TELEPHONE (OUTPATIENT)
Dept: FAMILY MEDICINE CLINIC | Age: 69
End: 2019-04-09

## 2019-04-09 NOTE — TELEPHONE ENCOUNTER
Patient called in and wants to know when he can drive again. Patient states that he would like a call back as soon as possible with the answer.  Please assist.

## 2019-04-10 NOTE — TELEPHONE ENCOUNTER
Attempted to call patient to relay doctors note. Left voicemail for patient to return call. Awaiting patient feedback.

## 2019-04-10 NOTE — TELEPHONE ENCOUNTER
Spoke with Jordan Dickerson, Verified 2 patient identifiers. Spoke with patient in regards to his driving privileges. Relayed Doctor's notes. Patient reports that he has changed his diet and no longer has the dizziness. Patient also reports that he has yet to schedule an appointment with neurology. Patient has been scheduled to follow up with Dr. Corinna Cedeno on [email or web address]. Will contact Dr. Cuh Double office to inquire about scheduling patient an appointment.   Patient acknowledges understanding and voices no further questions or concerns at this time

## 2019-04-10 NOTE — TELEPHONE ENCOUNTER
Called patient to provide neurology referral appointment with Dr. Vivica Klinefelter. Patient has been scheduled for 5/16/19 at 230pm at the [facility] location. Patient has been placed on the cancellation list to be seen earlier. Will fax specialist referral and notes. Awaiting notes to follow.

## 2019-04-15 ENCOUNTER — OFFICE VISIT (OUTPATIENT)
Dept: FAMILY MEDICINE CLINIC | Age: 69
End: 2019-04-15

## 2019-04-15 VITALS
HEART RATE: 60 BPM | RESPIRATION RATE: 16 BRPM | BODY MASS INDEX: 29.06 KG/M2 | DIASTOLIC BLOOD PRESSURE: 78 MMHG | TEMPERATURE: 97.7 F | WEIGHT: 164 LBS | OXYGEN SATURATION: 95 % | HEIGHT: 63 IN | SYSTOLIC BLOOD PRESSURE: 144 MMHG

## 2019-04-15 DIAGNOSIS — E11.9 TYPE 2 DIABETES MELLITUS WITHOUT COMPLICATION, WITH LONG-TERM CURRENT USE OF INSULIN (HCC): Primary | ICD-10-CM

## 2019-04-15 DIAGNOSIS — Z79.4 TYPE 2 DIABETES MELLITUS WITHOUT COMPLICATION, WITH LONG-TERM CURRENT USE OF INSULIN (HCC): Primary | ICD-10-CM

## 2019-04-15 DIAGNOSIS — R42 DIZZINESS: ICD-10-CM

## 2019-04-15 RX ORDER — INSULIN GLARGINE 100 [IU]/ML
30 INJECTION, SOLUTION SUBCUTANEOUS DAILY
Qty: 30 PEN | Refills: 12 | Status: SHIPPED | OUTPATIENT
Start: 2019-04-15

## 2019-04-15 NOTE — PROGRESS NOTES
Agnes March is a 70 y.o.  male and presents with    Chief Complaint   Patient presents with    Dizziness           Subjective:  Pt was seen here 2 wk ago with dizzy spells  I had treated him with antivert which controlled the spells but when he stopped the antivert the spells came back  I then referred him to neurology for 2nd opinion  Now he is here because he quit eating beets which he states caused his sugar to go too low and cause the dizzy spells. He now tells me he is no longer having dizzy spells and is no longer taking antivert. I did tell him that men in their 61 and 79 don't tolerate low blood sugar an that the recommendation for aic is between 7 and 8. He has cut his insulin to 20 units per day. I have told him generally he wants his fasting under 150 and 2 hour PP under 200     More than 1/2 of this 30 minute visit was spent in counselling and coordination of care, as described above.

## 2019-04-15 NOTE — PATIENT INSTRUCTIONS
1. Start with 20 units of insulin per day. 2. Continue to monitor you blood glucose morning and 1 hour after supper  3. Keep the fasting blood glucose under 150 and the post prandial glucose under 200   4. Adjust your insulin if needed.    5. If you choose to eat beets be careful about your blood pressure and also your blood sugar and watch for recurrant dizzy spells

## 2019-04-15 NOTE — PROGRESS NOTES
Mitzy Rodriguez. is a 70 y.o. male presents today for follow up on his vertigo. Patient reports he no longer has vertigo  He would also like to discuss activating his driving privledges. Pt is in Room # 4        1. Have you been to the ER, urgent care clinic since your last visit? Hospitalized since your last visit? No    2. Have you seen or consulted any other health care providers outside of the [facility] since your last visit? Include any pap smears or colon screening. No     Health Maintenance reviewed - .

## 2019-05-14 ENCOUNTER — DOCUMENTATION ONLY (OUTPATIENT)
Dept: FAMILY MEDICINE CLINIC | Age: 69
End: 2019-05-14

## 2019-05-14 NOTE — PROGRESS NOTES
Patient did not arrive to their scheduled appointment on 5/13/19. No show letter #1 sent on 05/14/19. Thank you.

## 2023-07-06 ENCOUNTER — HOSPITAL ENCOUNTER (OUTPATIENT)
Facility: HOSPITAL | Age: 73
Setting detail: RECURRING SERIES
Discharge: HOME | End: 2023-07-09
Payer: MEDICARE

## 2023-07-06 PROCEDURE — 97162 PT EVAL MOD COMPLEX 30 MIN: CPT

## 2023-07-06 PROCEDURE — 97110 THERAPEUTIC EXERCISES: CPT

## 2023-07-26 ENCOUNTER — HOSPITAL ENCOUNTER (OUTPATIENT)
Facility: HOSPITAL | Age: 73
Setting detail: RECURRING SERIES
Discharge: HOME | End: 2023-07-29
Payer: MEDICARE

## 2023-07-26 PROCEDURE — 97110 THERAPEUTIC EXERCISES: CPT

## 2023-07-26 PROCEDURE — 97530 THERAPEUTIC ACTIVITIES: CPT

## 2023-07-26 NOTE — PROGRESS NOTES
PT DISCHARGE DAILY NOTE AND SUMMARY     Date:2023  Patient name: Dorie Noriega. Start of Care: 2023   Referral source: Jhoana Dozier MD : [id number]   Medical/Treatment Diagnosis: Pain in left hip [M25.552] Onset Date:3/17/2023     Prior Hospitalization: see medical history Provider#: [id number]   Medications: Verified on Patient Summary List    Comorbidities: HTN, DM, dementia  Prior Level of Function:Ambulating with SBQC due to dementia/imbalance  Insurance: Payor: [employer] / Plan: Patsy Clam / Product Type: *No Product type* /      Visits from Start of Care: 2 Missed Visits: 0    Reporting Period : 2023  to 2023    Patient  verified yes     Visit #   Current / Total 2 8-16   Time   In / Out 11:18 12:03   Pain   In / Out 2 2   Subjective Functional Status/Changes: \"I did the exercises you gave me. Some times I felt great after I did them and some times my hip hurt just like it did. More times than not, it hurt. \" He states his pain ranges now from a 2/10 - 5/10. TREATMENT AREA =  Pain in left hip [M25.552]    OBJECTIVE     Therapeutic Procedures: Tx Min Billable or 1:1 Min (if diff from Tx Min) Procedure, Rationale, Specifics   30  [id number] Therapeutic Exercise (timed):  increase ROM, strength, coordination, balance, and proprioception to improve patient's ability to progress to PLOF and address remaining functional goals. (see flow sheet as applicable)     Details if applicable:  dalton VARGHESE, H/L hip abd/add, supine hamstring stretch, ROM, MMT   15  [id number] Therapeutic Activity (timed):  use of dynamic activities replicating functional movements to increase ROM, strength, coordination, balance, and proprioception in order to improve patient's ability to progress to PLOF and address remaining functional goals.   (see flow sheet as applicable)     Details if applicable:  bridge, FOTO   39  [facility] Totals Reminder: bill using total billable min of TIMED